# Patient Record
Sex: FEMALE | Race: WHITE | Employment: PART TIME | URBAN - METROPOLITAN AREA
[De-identification: names, ages, dates, MRNs, and addresses within clinical notes are randomized per-mention and may not be internally consistent; named-entity substitution may affect disease eponyms.]

---
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
## 2023-05-22 RX ORDER — IBUPROFEN 600 MG/1
600 TABLET ORAL EVERY 6 HOURS PRN
COMMUNITY
Start: 2020-10-10

## 2023-05-22 RX ORDER — ONDANSETRON 4 MG/1
4 TABLET, ORALLY DISINTEGRATING ORAL EVERY 8 HOURS PRN
COMMUNITY
Start: 2020-10-10

## 2023-11-01 ENCOUNTER — HOSPITAL ENCOUNTER (EMERGENCY)
Facility: HOSPITAL | Age: 23
Discharge: HOME | End: 2023-11-01
Attending: EMERGENCY MEDICINE
Payer: MEDICAID

## 2023-11-01 VITALS
HEART RATE: 78 BPM | OXYGEN SATURATION: 100 % | SYSTOLIC BLOOD PRESSURE: 103 MMHG | WEIGHT: 107.5 LBS | TEMPERATURE: 98.6 F | DIASTOLIC BLOOD PRESSURE: 65 MMHG | HEIGHT: 62 IN | RESPIRATION RATE: 19 BRPM | BODY MASS INDEX: 19.78 KG/M2

## 2023-11-01 DIAGNOSIS — R10.13 DYSPEPSIA: ICD-10-CM

## 2023-11-01 DIAGNOSIS — R10.11 ABDOMINAL PAIN, RIGHT UPPER QUADRANT: ICD-10-CM

## 2023-11-01 DIAGNOSIS — N30.00 ACUTE CYSTITIS WITHOUT HEMATURIA: ICD-10-CM

## 2023-11-01 DIAGNOSIS — R10.84 GENERALIZED ABDOMINAL PAIN: ICD-10-CM

## 2023-11-01 DIAGNOSIS — Z34.90 PREGNANCY, UNSPECIFIED GESTATIONAL AGE: Primary | ICD-10-CM

## 2023-11-01 LAB
ALBUMIN SERPL-MCNC: 3.1 G/DL (ref 3.5–5)
ALBUMIN/GLOB SERPL: 1 (ref 1.1–2.2)
ALP SERPL-CCNC: 32 U/L (ref 45–117)
ALT SERPL-CCNC: 13 U/L (ref 12–78)
ANION GAP SERPL CALC-SCNC: 12 MMOL/L (ref 5–15)
APPEARANCE UR: CLEAR
AST SERPL-CCNC: 31 U/L (ref 15–37)
BACTERIA URNS QL MICRO: ABNORMAL /HPF
BASOPHILS # BLD: 0 K/UL (ref 0–0.1)
BASOPHILS NFR BLD: 1 % (ref 0–1)
BILIRUB SERPL-MCNC: 0.4 MG/DL (ref 0.2–1)
BILIRUB UR QL: NEGATIVE
BUN SERPL-MCNC: 4 MG/DL (ref 6–20)
BUN/CREAT SERPL: 12 (ref 12–20)
CALCIUM SERPL-MCNC: 7.9 MG/DL (ref 8.5–10.1)
CHLORIDE SERPL-SCNC: 105 MMOL/L (ref 97–108)
CO2 SERPL-SCNC: 21 MMOL/L (ref 21–32)
COLOR UR: ABNORMAL
CREAT SERPL-MCNC: 0.33 MG/DL (ref 0.55–1.02)
DIFFERENTIAL METHOD BLD: NORMAL
EOSINOPHIL # BLD: 0 K/UL (ref 0–0.4)
EOSINOPHIL NFR BLD: 0 % (ref 0–7)
EPITH CASTS URNS QL MICRO: ABNORMAL /LPF
ERYTHROCYTE [DISTWIDTH] IN BLOOD BY AUTOMATED COUNT: 12.2 % (ref 11.5–14.5)
GLOBULIN SER CALC-MCNC: 3.1 G/DL (ref 2–4)
GLUCOSE SERPL-MCNC: 72 MG/DL (ref 65–100)
GLUCOSE UR STRIP.AUTO-MCNC: NEGATIVE MG/DL
HCG SERPL-ACNC: ABNORMAL MIU/ML (ref 0–6)
HCG UR QL: POSITIVE
HCT VFR BLD AUTO: 40.9 % (ref 35–47)
HGB BLD-MCNC: 13.4 G/DL (ref 11.5–16)
HGB UR QL STRIP: NEGATIVE
IMM GRANULOCYTES # BLD AUTO: 0 K/UL (ref 0–0.04)
IMM GRANULOCYTES NFR BLD AUTO: 0 % (ref 0–0.5)
KETONES UR QL STRIP.AUTO: 40 MG/DL
LEUKOCYTE ESTERASE UR QL STRIP.AUTO: ABNORMAL
LIPASE SERPL-CCNC: 17 U/L (ref 13–75)
LYMPHOCYTES # BLD: 1.7 K/UL (ref 0.8–3.5)
LYMPHOCYTES NFR BLD: 27 % (ref 12–49)
MCH RBC QN AUTO: 28.9 PG (ref 26–34)
MCHC RBC AUTO-ENTMCNC: 32.8 G/DL (ref 30–36.5)
MCV RBC AUTO: 88.3 FL (ref 80–99)
MONOCYTES # BLD: 0.4 K/UL (ref 0–1)
MONOCYTES NFR BLD: 7 % (ref 5–13)
NEUTS SEG # BLD: 4.1 K/UL (ref 1.8–8)
NEUTS SEG NFR BLD: 65 % (ref 32–75)
NITRITE UR QL STRIP.AUTO: POSITIVE
NRBC # BLD: 0 K/UL (ref 0–0.01)
NRBC BLD-RTO: 0 PER 100 WBC
PH UR STRIP: 6 (ref 5–8)
PLATELET # BLD AUTO: 270 K/UL (ref 150–400)
PMV BLD AUTO: 10.2 FL (ref 8.9–12.9)
POTASSIUM SERPL-SCNC: 4.5 MMOL/L (ref 3.5–5.1)
PROT SERPL-MCNC: 6.2 G/DL (ref 6.4–8.2)
PROT UR STRIP-MCNC: NEGATIVE MG/DL
RBC # BLD AUTO: 4.63 M/UL (ref 3.8–5.2)
RBC #/AREA URNS HPF: ABNORMAL /HPF (ref 0–5)
SODIUM SERPL-SCNC: 138 MMOL/L (ref 136–145)
SP GR UR REFRACTOMETRY: 1.02
UROBILINOGEN UR QL STRIP.AUTO: 1 EU/DL (ref 0.2–1)
WBC # BLD AUTO: 6.3 K/UL (ref 3.6–11)
WBC URNS QL MICRO: ABNORMAL /HPF (ref 0–4)

## 2023-11-01 PROCEDURE — 96361 HYDRATE IV INFUSION ADD-ON: CPT

## 2023-11-01 PROCEDURE — 96375 TX/PRO/DX INJ NEW DRUG ADDON: CPT

## 2023-11-01 PROCEDURE — 83690 ASSAY OF LIPASE: CPT

## 2023-11-01 PROCEDURE — C9113 INJ PANTOPRAZOLE SODIUM, VIA: HCPCS | Performed by: EMERGENCY MEDICINE

## 2023-11-01 PROCEDURE — 99284 EMERGENCY DEPT VISIT MOD MDM: CPT

## 2023-11-01 PROCEDURE — 85025 COMPLETE CBC W/AUTO DIFF WBC: CPT

## 2023-11-01 PROCEDURE — 80053 COMPREHEN METABOLIC PANEL: CPT

## 2023-11-01 PROCEDURE — 6370000000 HC RX 637 (ALT 250 FOR IP): Performed by: EMERGENCY MEDICINE

## 2023-11-01 PROCEDURE — 36415 COLL VENOUS BLD VENIPUNCTURE: CPT

## 2023-11-01 PROCEDURE — 96374 THER/PROPH/DIAG INJ IV PUSH: CPT

## 2023-11-01 PROCEDURE — 6360000002 HC RX W HCPCS: Performed by: EMERGENCY MEDICINE

## 2023-11-01 PROCEDURE — 84702 CHORIONIC GONADOTROPIN TEST: CPT

## 2023-11-01 PROCEDURE — A4216 STERILE WATER/SALINE, 10 ML: HCPCS | Performed by: EMERGENCY MEDICINE

## 2023-11-01 PROCEDURE — 81001 URINALYSIS AUTO W/SCOPE: CPT

## 2023-11-01 PROCEDURE — 81025 URINE PREGNANCY TEST: CPT

## 2023-11-01 PROCEDURE — 2580000003 HC RX 258: Performed by: EMERGENCY MEDICINE

## 2023-11-01 RX ORDER — CEPHALEXIN 500 MG/1
500 CAPSULE ORAL 3 TIMES DAILY
Qty: 21 CAPSULE | Refills: 0 | Status: SHIPPED | OUTPATIENT
Start: 2023-11-01 | End: 2023-11-08

## 2023-11-01 RX ORDER — ONDANSETRON 4 MG/1
4 TABLET, ORALLY DISINTEGRATING ORAL 3 TIMES DAILY PRN
Qty: 21 TABLET | Refills: 0 | Status: SHIPPED | OUTPATIENT
Start: 2023-11-01

## 2023-11-01 RX ORDER — FAMOTIDINE 40 MG/1
20 TABLET, FILM COATED ORAL DAILY
Qty: 30 TABLET | Refills: 0 | Status: SHIPPED | OUTPATIENT
Start: 2023-11-01

## 2023-11-01 RX ORDER — ONDANSETRON 2 MG/ML
4 INJECTION INTRAMUSCULAR; INTRAVENOUS EVERY 6 HOURS PRN
Status: DISCONTINUED | OUTPATIENT
Start: 2023-11-01 | End: 2023-11-02 | Stop reason: HOSPADM

## 2023-11-01 RX ORDER — 0.9 % SODIUM CHLORIDE 0.9 %
1000 INTRAVENOUS SOLUTION INTRAVENOUS ONCE
Status: COMPLETED | OUTPATIENT
Start: 2023-11-01 | End: 2023-11-01

## 2023-11-01 RX ORDER — CEPHALEXIN 500 MG/1
500 CAPSULE ORAL EVERY 6 HOURS SCHEDULED
Status: DISCONTINUED | OUTPATIENT
Start: 2023-11-02 | End: 2023-11-02 | Stop reason: HOSPADM

## 2023-11-01 RX ADMIN — CEPHALEXIN 500 MG: 500 CAPSULE ORAL at 22:08

## 2023-11-01 RX ADMIN — ONDANSETRON 4 MG: 2 INJECTION INTRAMUSCULAR; INTRAVENOUS at 20:01

## 2023-11-01 RX ADMIN — ALUMINUM HYDROXIDE, MAGNESIUM HYDROXIDE, AND SIMETHICONE 40 ML: 200; 200; 20 SUSPENSION ORAL at 20:01

## 2023-11-01 RX ADMIN — SODIUM CHLORIDE 1000 ML: 9 INJECTION, SOLUTION INTRAVENOUS at 20:00

## 2023-11-01 RX ADMIN — PANTOPRAZOLE SODIUM 40 MG: 40 INJECTION, POWDER, FOR SOLUTION INTRAVENOUS at 20:01

## 2023-11-01 ASSESSMENT — PAIN DESCRIPTION - DESCRIPTORS: DESCRIPTORS: ACHING

## 2023-11-01 ASSESSMENT — PAIN - FUNCTIONAL ASSESSMENT
PAIN_FUNCTIONAL_ASSESSMENT: NONE - DENIES PAIN
PAIN_FUNCTIONAL_ASSESSMENT: 0-10

## 2023-11-01 ASSESSMENT — PAIN SCALES - GENERAL
PAINLEVEL_OUTOF10: 8
PAINLEVEL_OUTOF10: 0

## 2023-11-01 ASSESSMENT — PAIN DESCRIPTION - LOCATION: LOCATION: ABDOMEN

## 2023-11-01 ASSESSMENT — PAIN DESCRIPTION - ORIENTATION: ORIENTATION: LEFT;RIGHT

## 2023-11-01 NOTE — ED PROVIDER NOTES
[facility] EMERGENCY DEPT  EMERGENCY DEPARTMENT ENCOUNTER       Pt Name: Jae Dandy  MRN: [id number]  [address] [facility] 2000  Date of evaluation: 11/1/2023  Provider: Benigno Morrison MD   PCP: Unknown, Provider  Note Started: 4:37 AM 11/1/23     CHIEF COMPLAINT       Chief Complaint   Patient presents with    Abdominal Pain        HISTORY OF PRESENT ILLNESS: 1 or more elements      History From: patient, History limited by:  none     Jae Dandy is a 26 y.o. female who presents with Diffuse abdominal pain for several weeks. [facility] and told she was pregnant. States she has had persistent pain and inability to consistently keep down food or soda. Admits to intermittent diarrhea. Denies fever, back pain, hematuria, vaginal discharge, dysuria, medical history, concern for STI. LMP was September 6. [facility] and states it is helping the nausea but the pain persist.     Nursing Notes were all reviewed and agreed with or any disagreements were addressed in the HPI. REVIEW OF SYSTEMS        Positives and Pertinent negatives as per HPI. PAST HISTORY     Past Medical History:  No past medical history on file. Past Surgical History:  No past surgical history on file. Family History:  No family history on file. Social History: Allergies:  No Known Allergies    CURRENT MEDICATIONS      Discharge Medication List as of 11/1/2023 10:21 PM        CONTINUE these medications which have NOT CHANGED    Details   ibuprofen (ADVIL;MOTRIN) 600 MG tablet Take 1 tablet by mouth every 6 hours as neededHistorical Med      !! ondansetron (ZOFRAN-ODT) 4 MG disintegrating tablet Take 1 tablet by mouth every 8 hours as neededHistorical Med       !! - Potential duplicate medications found. Please discuss with provider.           SCREENINGS               No data recorded         PHYSICAL EXAM      ED Triage Vitals [11/01/23 1911]   Enc Vitals Group      BP

## 2023-11-02 NOTE — ED NOTES
Patient (s)  given copy of dc instructions and 3 paper  script(s). Patient (s)  verbalized understanding of instructions and script (s). Patient given a current medication reconciliation form and verbalized understanding of their medications. Patient (s) verbalized understanding of the importance of discussing medications with his or her physician or clinic they will be following up with. Patient alert and oriented and in no acute distress. Patient discharged home ambulatory with a steady gait unassisted.       Gume Hartley RN  11/01/23 [id number]

## 2023-11-09 ENCOUNTER — OFFICE VISIT (OUTPATIENT)
Age: 23
End: 2023-11-09

## 2023-11-09 VITALS — DIASTOLIC BLOOD PRESSURE: 62 MMHG | SYSTOLIC BLOOD PRESSURE: 98 MMHG | BODY MASS INDEX: 19.94 KG/M2 | WEIGHT: 109 LBS

## 2023-11-09 DIAGNOSIS — Z34.90 PREGNANCY, UNSPECIFIED GESTATIONAL AGE: Primary | ICD-10-CM

## 2023-11-09 LAB
ABO, EXTERNAL RESULT: NORMAL
C. TRACHOMATIS, EXTERNAL RESULT: NEGATIVE
HEP B, EXTERNAL RESULT: NON REACTIVE
HEPATITIS C ANTIBODY, EXTERNAL RESULT: NON REACTIVE
HIV, EXTERNAL RESULT: NON REACTIVE
N. GONORRHOEAE, EXTERNAL RESULT: NEGATIVE
RH FACTOR, EXTERNAL RESULT: POSITIVE
RUBELLA TITER, EXTERNAL RESULT: NORMAL

## 2023-11-09 RX ORDER — TRIAMCINOLONE ACETONIDE 0.25 MG/G
OINTMENT TOPICAL
Qty: 80 G | Refills: 1 | Status: SHIPPED | OUTPATIENT
Start: 2023-11-09 | End: 2023-11-16

## 2023-11-10 LAB
ABO GROUP BLD: NORMAL
BLD GP AB SCN SERPL QL: NEGATIVE
ERYTHROCYTE [DISTWIDTH] IN BLOOD BY AUTOMATED COUNT: 12.3 % (ref 11.7–15.4)
HBV SURFACE AG SERPL QL IA: NEGATIVE
HCT VFR BLD AUTO: 37.6 % (ref 34–46.6)
HCV IGG SERPL QL IA: NON REACTIVE
HGB BLD-MCNC: 12.7 G/DL (ref 11.1–15.9)
HIV 1+2 AB+HIV1 P24 AG SERPL QL IA: NON REACTIVE
MCH RBC QN AUTO: 29.8 PG (ref 26.6–33)
MCHC RBC AUTO-ENTMCNC: 33.8 G/DL (ref 31.5–35.7)
MCV RBC AUTO: 88 FL (ref 79–97)
PLATELET # BLD AUTO: 278 X10E3/UL (ref 150–450)
RBC # BLD AUTO: 4.26 X10E6/UL (ref 3.77–5.28)
RH BLD: POSITIVE
RUBV IGG SERPL IA-ACNC: 2.16 INDEX
SPECIMEN STATUS REPORT: NORMAL
WBC # BLD AUTO: 4.9 X10E3/UL (ref 3.4–10.8)

## 2023-11-11 LAB
BACTERIA UR CULT: NO GROWTH
TREPONEMA PALLIDUM IGG+IGM AB [PRESENCE] IN SERUM OR PLASMA BY IMMUNOASSAY: NON REACTIVE

## 2023-11-13 LAB
HGB A MFR BLD ELPH: 97 % (ref 96.4–98.8)
HGB A2 MFR BLD ELPH: 3 % (ref 1.8–3.2)
HGB F MFR BLD ELPH: 0 % (ref 0–2)
HGB FRACT BLD-IMP: NORMAL
HGB S MFR BLD ELPH: 0 %

## 2023-11-15 DIAGNOSIS — O21.9 NAUSEA AND VOMITING IN PREGNANCY PRIOR TO 22 WEEKS GESTATION: Primary | ICD-10-CM

## 2023-11-15 LAB
C TRACH RRNA SPEC QL NAA+PROBE: NEGATIVE
Lab: NORMAL
N GONORRHOEA RRNA SPEC QL NAA+PROBE: NEGATIVE
NTRA 22Q11.2 DELETION SYNDROME POPULATION-BASED RISK TEXT: NORMAL
NTRA 22Q11.2 DELETION SYNDROME RESULT TEXT: NORMAL
NTRA 22Q11.2 DELETION SYNDROME RISK SCORE TEXT: NORMAL
NTRA FETAL FRACTION: NORMAL
NTRA GENDER OF FETUS: NORMAL
NTRA MONOSOMY X AGE-BASED RISK TEXT: NORMAL
NTRA MONOSOMY X RESULT TEXT: NORMAL
NTRA MONOSOMY X RISK SCORE TEXT: NORMAL
NTRA TRIPLOIDY RESULT TEXT: NORMAL
NTRA TRISOMY 13 AGE-BASED RISK TEXT: NORMAL
NTRA TRISOMY 13 RESULT TEXT: NORMAL
NTRA TRISOMY 13 RISK SCORE TEXT: NORMAL
NTRA TRISOMY 18 AGE-BASED RISK TEXT: NORMAL
NTRA TRISOMY 18 RESULT TEXT: NORMAL
NTRA TRISOMY 18 RISK SCORE TEXT: NORMAL
NTRA TRISOMY 21 AGE-BASED RISK TEXT: NORMAL
NTRA TRISOMY 21 RESULT TEXT: NORMAL
NTRA TRISOMY 21 RISK SCORE TEXT: NORMAL
T VAGINALIS RRNA SPEC QL NAA+PROBE: NEGATIVE
VZV IGG SER IA-ACNC: 176 INDEX

## 2023-11-15 RX ORDER — ONDANSETRON 4 MG/1
4 TABLET, ORALLY DISINTEGRATING ORAL 3 TIMES DAILY PRN
Qty: 21 TABLET | Refills: 0 | Status: SHIPPED | OUTPATIENT
Start: 2023-11-15

## 2023-11-17 LAB
Lab: NEGATIVE
Lab: NORMAL
NTRA CYSTIC FIBROSIS: NEGATIVE
NTRA DUCHENNE/BECKER MUSCULAR DYSTROPHY: NEGATIVE
NTRA FRAGILE X SYNDROME: NEGATIVE
NTRA SPINAL MUSCULAR ATROPHY: NEGATIVE

## 2023-12-06 ENCOUNTER — ROUTINE PRENATAL (OUTPATIENT)
Age: 23
End: 2023-12-06

## 2023-12-06 VITALS — SYSTOLIC BLOOD PRESSURE: 104 MMHG | BODY MASS INDEX: 20.49 KG/M2 | DIASTOLIC BLOOD PRESSURE: 74 MMHG | WEIGHT: 112 LBS

## 2023-12-06 DIAGNOSIS — Z34.00 SUPERVISION OF NORMAL FIRST PREGNANCY, ANTEPARTUM: ICD-10-CM

## 2023-12-06 DIAGNOSIS — Z3A.13 13 WEEKS GESTATION OF PREGNANCY: Primary | ICD-10-CM

## 2023-12-06 DIAGNOSIS — O26.899 PELVIC PAIN AFFECTING PREGNANCY, ANTEPARTUM: ICD-10-CM

## 2023-12-06 DIAGNOSIS — R10.2 PELVIC PAIN AFFECTING PREGNANCY, ANTEPARTUM: ICD-10-CM

## 2023-12-06 LAB
BILIRUBIN, URINE, POC: NEGATIVE
BLOOD URINE, POC: NEGATIVE
GLUCOSE URINE, POC: NEGATIVE
KETONES, URINE, POC: NEGATIVE
LEUKOCYTE ESTERASE, URINE, POC: NEGATIVE
NITRITE, URINE, POC: NEGATIVE
PH, URINE, POC: 6 (ref 4.6–8)
PROTEIN,URINE, POC: NEGATIVE
SPECIFIC GRAVITY, URINE, POC: 1.01 (ref 1–1.03)
URINALYSIS CLARITY, POC: CLEAR
URINALYSIS COLOR, POC: YELLOW
UROBILINOGEN, POC: NORMAL

## 2023-12-06 PROCEDURE — 0502F SUBSEQUENT PRENATAL CARE: CPT | Performed by: OBSTETRICS & GYNECOLOGY

## 2023-12-06 RX ORDER — ONDANSETRON 4 MG/1
4 TABLET, FILM COATED ORAL DAILY PRN
Qty: 30 TABLET | Refills: 1 | Status: SHIPPED | OUTPATIENT
Start: 2023-12-06

## 2023-12-06 RX ORDER — PNV NO.95/FERROUS FUM/FOLIC AC 28MG-0.8MG
1 TABLET ORAL DAILY
Qty: 30 TABLET | Refills: 11 | Status: SHIPPED | OUTPATIENT
Start: 2023-12-06

## 2023-12-06 RX ORDER — ASPIRIN 81 MG/1
81 TABLET ORAL DAILY
Qty: 2 TABLET | Refills: 0 | Status: SHIPPED | OUTPATIENT
Start: 2023-12-06

## 2023-12-06 RX ORDER — BISACODYL 5 MG/1
5 TABLET, DELAYED RELEASE ORAL DAILY PRN
Qty: 30 TABLET | Refills: 3 | Status: SHIPPED | OUTPATIENT
Start: 2023-12-06

## 2023-12-28 ENCOUNTER — ROUTINE PRENATAL (OUTPATIENT)
Age: 23
End: 2023-12-28

## 2023-12-28 VITALS — SYSTOLIC BLOOD PRESSURE: 106 MMHG | DIASTOLIC BLOOD PRESSURE: 58 MMHG | BODY MASS INDEX: 21.22 KG/M2 | WEIGHT: 116 LBS

## 2023-12-28 DIAGNOSIS — Z34.90 PREGNANCY, UNSPECIFIED GESTATIONAL AGE: Primary | ICD-10-CM

## 2023-12-28 PROCEDURE — 0502F SUBSEQUENT PRENATAL CARE: CPT | Performed by: OBSTETRICS & GYNECOLOGY

## 2023-12-28 RX ORDER — ONDANSETRON 4 MG/1
4 TABLET, FILM COATED ORAL DAILY PRN
Qty: 30 TABLET | Refills: 0 | Status: SHIPPED | OUTPATIENT
Start: 2023-12-28

## 2024-01-16 RX ORDER — ONDANSETRON 4 MG/1
4 TABLET, FILM COATED ORAL DAILY PRN
Qty: 30 TABLET | Refills: 0 | Status: SHIPPED | OUTPATIENT
Start: 2024-01-16

## 2024-01-19 ENCOUNTER — TELEPHONE (OUTPATIENT)
Age: 24
End: 2024-01-19

## 2024-01-19 NOTE — TELEPHONE ENCOUNTER
I have called and LM for the pt informing her that her nurse and doctor agree that she should be seen in an urgent care or ER setting.

## 2024-01-19 NOTE — TELEPHONE ENCOUNTER
Patient called, name and  verified. Patient is calling c/o chest pain that started yesterday accompanied with a headache. She reports that is comes on strong and then dissipates. Pt denies any SOB. She was unable to eat yesterday as she felt like it got worse. She denies any other sx. We talked about acid reflux which the patient darling snot think is the case. She has been advised to visit urgent care where she can be further evaluated but I wanted to check in with you. Please advise. She is currently a  and 20w1d. Age is 23.

## 2024-01-24 ENCOUNTER — ROUTINE PRENATAL (OUTPATIENT)
Age: 24
End: 2024-01-24

## 2024-01-24 VITALS — WEIGHT: 120 LBS | DIASTOLIC BLOOD PRESSURE: 64 MMHG | SYSTOLIC BLOOD PRESSURE: 104 MMHG | BODY MASS INDEX: 21.95 KG/M2

## 2024-01-24 DIAGNOSIS — Z36.1 ANTENATAL SCREENING FOR RAISED ALPHAFETOPROTEIN LEVEL: ICD-10-CM

## 2024-01-24 DIAGNOSIS — Z34.00 SUPERVISION OF NORMAL FIRST PREGNANCY, ANTEPARTUM: Primary | ICD-10-CM

## 2024-01-24 PROCEDURE — 0502F SUBSEQUENT PRENATAL CARE: CPT | Performed by: OBSTETRICS & GYNECOLOGY

## 2024-01-24 RX ORDER — FAMOTIDINE 20 MG/1
20 TABLET, FILM COATED ORAL DAILY
Qty: 30 TABLET | Refills: 3 | Status: SHIPPED | OUTPATIENT
Start: 2024-01-24

## 2024-01-24 NOTE — PROGRESS NOTES
Patient here for return OB visit at 20w6d. She reports she is feeling well, having heart burns it comes and it goes.  She reports good fetal movement.   She denies vaginal bleeding, loss of fluid, abnormal vaginal discharge, UTI symptoms, cramping, or contractions.     FAS today demonstrates the following -   A SINGLE VIABLE IUP AT 20W6D IS SEEN. FETAL CARDIAC MOTION OBSERVED.   FETAL ANATOMY WAS WELL VISUALIZED.   THERE APPEARS TO BE AN ECHOGENIC FOCI SEEN IN THE LEFT VENTRICLE OF THE FETAL HEART. OTHERWISE FETAL ANATOMY APPEARS WNL.   APPROPRIATE GROWTH MEASURED. SIZE = DATES.   WILMAR, PLACENTA AND CERVIX APPEAR WITHIN NORMAL LIMITS.      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    Isabelle Glover MD

## 2024-01-24 NOTE — PROGRESS NOTES
Patient here for return OB visit at 20w6d. She reports no concerns.  She reports good fetal movement.   She denies vaginal bleeding, loss of fluid, abnormal vaginal discharge, UTI symptoms, cramping, or contractions.     FAS today demonstrates the following -   A SINGLE VIABLE IUP AT 20W6D IS SEEN. FETAL CARDIAC MOTION OBSERVED.   FETAL ANATOMY WAS WELL VISUALIZED.   THERE APPEARS TO BE AN ECHOGENIC FOCI SEEN IN THE LEFT VENTRICLE OF THE FETAL HEART. OTHERWISE FETAL ANATOMY APPEARS WNL.   APPROPRIATE GROWTH MEASURED. SIZE = DATES.   WILMAR, PLACENTA AND CERVIX APPEAR WITHIN NORMAL LIMITS.      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 Cardiac echogenic foci  3rd TM labs: GTT___ Hgb___ Plts___  Flu:__ TDAP:__  Rhogam: O POSITIVE  GBS:  Postpartum Care: Breast/Bottle Circ      Problem List:  1)Eczema   -triamcinolone cream PRN  2)Cardiac echogenic foci   -normal NIPT          Return in about 4 weeks (around 2024), or growth US in four weeks with CHELI Glover MD

## 2024-01-24 NOTE — PROGRESS NOTES
Patient is doing well,   She has been having some heart burn it comes and it goes , haven't took anything for it    Denies VB and LOF  Active FM

## 2024-01-30 LAB
AFP INTERP SERPL-IMP: NORMAL
AFP MOM SERPL: 1.01
AFP SERPL-MCNC: 67.6 NG/ML
AGE AT DELIVERY: 22.6 YR
AGE OF EGG DONOR: NORMAL
AGE OF EGG DONOR: NORMAL
COMMENT: NORMAL
DONOR EGG?: NO
DONOR EGG?: NORMAL
FAMILY HISTORY NTD: NORMAL
FHX NTD (Y OR N): NORMAL
GA METHOD: NORMAL
GA: 20 WEEKS
IDDM PATIENT QL: NO
INSULIN DEP. DIABETIC: 1920
Lab: 120
Lab: NO
Lab: NORMAL
MAT SCN FOR FETAL ABNORMALITIES SERPL: NORMAL
MULTIPLE PREGNANCY: NO
NEURAL TUBE DEFECT RISK FETUS: NORMAL
NUMBER OF FETUSES: NO
OTHER INDICATIONS: NO
OTHER INDICATIONS: NORMAL
PREVIOUSLY ELEVATED AFP (Y OR N): 20
PREVIOUSLY ELEVATED AFP (Y OR N): NO
PRIOR 1ST TRIM TESTING ?: NO
PRIOR 2ND TRIM TESTING ?: NO
PRIOR DS/NTD SCREEN CURRENT PREGNANCY?: NO
PRIOR DS/NTD SCREEN CURRENT PREGNANCY?: NORMAL
PRIOR FIRST TRIMESTER TESTING (Y OR N ): NORMAL
PRIOR PREGNANCY WITH DOWN SYNDROME (Y OR N): 1
PRIOR PREGNANCY WITH DOWN SYNDROME (Y OR N): NO
TYPE OF EGG DONOR: NORMAL
TYPE OF EGG DONOR: NORMAL

## 2024-01-31 RX ORDER — ONDANSETRON 4 MG/1
4 TABLET, FILM COATED ORAL DAILY PRN
Qty: 30 TABLET | Refills: 0 | Status: SHIPPED | OUTPATIENT
Start: 2024-01-31

## 2024-02-20 ENCOUNTER — TELEPHONE (OUTPATIENT)
Age: 24
End: 2024-02-20

## 2024-02-20 NOTE — TELEPHONE ENCOUNTER
Patient called, name and  verified. Patient is calling c/o nausea and vomiting despite taking her antiemetic medications. She also reports being weak and unable to stand. She was last able to eat around 10:00 pm last night. She also reports that she feels she may have a fever. She has been advised to seek care at an urgent care and to update us. Pt has expressed understanding and will call us later today with an update. She is currently a  and 24w5d. Age is 23. Pt denies any additional sx.

## 2024-02-22 ENCOUNTER — ROUTINE PRENATAL (OUTPATIENT)
Age: 24
End: 2024-02-22

## 2024-02-22 VITALS — SYSTOLIC BLOOD PRESSURE: 114 MMHG | WEIGHT: 123 LBS | BODY MASS INDEX: 22.5 KG/M2 | DIASTOLIC BLOOD PRESSURE: 58 MMHG

## 2024-02-22 DIAGNOSIS — R42 DIZZINESS: ICD-10-CM

## 2024-02-22 DIAGNOSIS — O26.819 PREGNANCY RELATED FATIGUE, ANTEPARTUM: ICD-10-CM

## 2024-02-22 DIAGNOSIS — Z3A.27 27 WEEKS GESTATION OF PREGNANCY: Primary | ICD-10-CM

## 2024-02-22 PROCEDURE — 0502F SUBSEQUENT PRENATAL CARE: CPT | Performed by: OBSTETRICS & GYNECOLOGY

## 2024-02-22 RX ORDER — FAMOTIDINE 20 MG/1
20 TABLET, FILM COATED ORAL DAILY
Qty: 60 TABLET | Refills: 3 | Status: SHIPPED | OUTPATIENT
Start: 2024-02-22

## 2024-02-22 RX ORDER — ONDANSETRON 4 MG/1
4 TABLET, FILM COATED ORAL DAILY PRN
Qty: 30 TABLET | Refills: 0 | Status: SHIPPED | OUTPATIENT
Start: 2024-02-22

## 2024-02-22 NOTE — PROGRESS NOTES
Patient here for return OB visit at 25w0d.   Ultrasound follow up today due to echogenic foci.  LIMITED OB SCAN A SINGLE VERTEX 25W0D IUP IS SEEN. FETAL CARDIAC MOTION OBSERVED. THERE APPEARS TO BE AN ECHOGENIC FOCI SEEN IN THE LEFT VENTRICLE OF THE FETAL HEART. THE FEET BILATERALLY APPEARS SANDAL GAP. EFW= 1 LB 8 OZ ( 35 %) WILMAR= 20.8 CM PLACENTA APPEARS WITHIN NORMAL LIMITS.     Discussed referral to [facility] given above findings.   All questions answered   She reports good fetal movement.   She denies vaginal bleeding, loss of fluid, abnormal vaginal discharge, UTI symptoms, cramping, or contractions.       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 Cardiac echogenic foci  3rd TM labs: GTT___ Hgb___ Plts___  Flu:__ TDAP:__  Rhogam: O POSITIVE  GBS:  Postpartum Care: Breast/Bottle Circ      Problem List:  1)Eczema   -triamcinolone cream PRN  2)Cardiac echogenic foci   -normal NIPT          Return 28 week apt with glucola.

## 2024-02-23 ENCOUNTER — TELEPHONE (OUTPATIENT)
Age: 24
End: 2024-02-23

## 2024-02-23 NOTE — TELEPHONE ENCOUNTER
Patient returned call to office - scheduled ultrasound for 3/1 at 8am. Patient unable to do VV with genetic counselor today. Scheduled for 3/11 at 8am.

## 2024-03-01 ENCOUNTER — ROUTINE PRENATAL (OUTPATIENT)
Age: 24
End: 2024-03-01
Payer: MEDICAID

## 2024-03-01 VITALS — HEART RATE: 86 BPM | DIASTOLIC BLOOD PRESSURE: 70 MMHG | SYSTOLIC BLOOD PRESSURE: 101 MMHG

## 2024-03-01 DIAGNOSIS — Z3A.26 26 WEEKS GESTATION OF PREGNANCY: Primary | ICD-10-CM

## 2024-03-01 PROCEDURE — 76811 OB US DETAILED SNGL FETUS: CPT | Performed by: OBSTETRICS & GYNECOLOGY

## 2024-03-01 PROCEDURE — 99203 OFFICE O/P NEW LOW 30 MIN: CPT | Performed by: OBSTETRICS & GYNECOLOGY

## 2024-03-01 NOTE — PROCEDURES
referred at 26 weeks regarding the findings of an intracardiac echogenic focus in the fetal heart and possible sandal gap. She has  low risk NIPT.    Her and the FOB have a negative family history for congenital abnormalities.    Today' ultrasound reveals normal fetal anatomy of major structures. An echo genic focus is present and there subjectively a widened space between the first 2 toes in the  foot.  Biometry is appropriate    Regarding potential ultrasound markers for Down Syndrome the [facility] consult series # 57 from 2021 does not mention sandal gap as a Downs marker and as for the EIF if  it is otherwise structurally isolated no further testing is recommended. The appearance of the toes is likely normal variation. There are syndromes that can involve a sandal  gap appearance, CLOVES syndrome and possible ectrodactyly but there are no other findings to support either.    Plan:  Routine prenatal care    30 minutes spent in consultation.    Follow-up  ========    No Maternal Fetal Medicine follow up is indicated. We will gladly see your patient as clinically indicated.    Coding  ======    Code: 79349  Description: Ultrasound, pregnant uterus, real time with image documentation, fetal and maternal evaluation plus detailed fetal anatomic examination, transabdominal  approach;single or first gestation

## 2024-03-11 ENCOUNTER — TELEMEDICINE (OUTPATIENT)
Age: 24
End: 2024-03-11
Payer: MEDICAID

## 2024-03-11 DIAGNOSIS — O28.3 ABNORMAL ULTRASONIC FINDING ON ANTENATAL SCREENING OF MOTHER: Primary | ICD-10-CM

## 2024-03-11 DIAGNOSIS — Z3A.27 27 WEEKS GESTATION OF PREGNANCY: ICD-10-CM

## 2024-03-11 PROCEDURE — 96040 PR MEDICAL GENETICS COUNSELING EACH 30 MINUTES: CPT | Performed by: COUNSELOR

## 2024-03-12 ENCOUNTER — TELEPHONE (OUTPATIENT)
Age: 24
End: 2024-03-12

## 2024-03-12 NOTE — PROGRESS NOTES
Jessy Sanchez is a 23 y.o. female seen on 24 in our [place] office for genetic counseling regarding soft markers seen on previous ultrasound. Jessy Sanchez will be 23 at the Estimated Date of Delivery: 24; . Genetic counseling was performed via Telemedicine today. The patient was unaccompanied to her appointment.    Impression and Recommendations:    - The soft markers from the patient's previous ultrasound were reviewed, including relevant genetic risks in light of her normal NIPT results.  - The patient DECLINED diagnostic amniocentesis at this time.  - The patient's normal carrier screening results were reviewed.  - The patient is not currently scheduled for follow up with our office, but we are happy to see her back as clinically indicated.    Family and pregnancy histories were taken. The following information was discussed with the patient:    Genetic Screening:    Jessy previously had an ultrasound with the [facility] that identified an echogenic intraventricular cardiac focus (EICF) and a potential sandalgap toe. Per the MFM note from 3/1/24, Dr. Gonsalez notes that an EICF is present and that \"there subjectively a widened space between the first 2 toes in the foot.\"    Fetal intraventricular echogenic foci (EICF) have been considered as a benign condition, probably representing a normal variant of the development of papillary muscles in most pregnancies. It is usually noted singly and on the left side; occasionally it can be seen on the right or bilaterally.  When the heart is otherwise structurally normal, there is no evidence of anatomic or functional significance through childhood. Some studies have suggested that there may be an increased risk for chromosome abnormality when EICF are noted on ultrasound, especially if there are other indications for increased risk (ex: AMA, abnormal screening, or other differences identified by ultrasound). Literature reviews

## 2024-03-12 NOTE — TELEPHONE ENCOUNTER
Called patient at her request via [employer], as she said she had questions. Spoke with the patient on the phone, she was asking about payment due at time of 3/14/24 appointment. Advised patient that her appointment on 3/14/24 is with Dr. Glover's office, her OB, and told patient she would have to give their office a call to get that question answered.    The patient verbalized her understanding of the information as it was presented to her today; she denies any further questions or concerns at this time.    Chani Perez MS, [facility]  Licensed, Certified Genetic Counselor

## 2024-03-21 ENCOUNTER — ROUTINE PRENATAL (OUTPATIENT)
Age: 24
End: 2024-03-21

## 2024-03-21 VITALS — BODY MASS INDEX: 23.41 KG/M2 | SYSTOLIC BLOOD PRESSURE: 102 MMHG | WEIGHT: 128 LBS | DIASTOLIC BLOOD PRESSURE: 60 MMHG

## 2024-03-21 DIAGNOSIS — Z34.90 PREGNANCY, UNSPECIFIED GESTATIONAL AGE: ICD-10-CM

## 2024-03-21 DIAGNOSIS — Z34.90 ENCOUNTER FOR SUPERVISION OF NORMAL PREGNANCY, ANTEPARTUM, UNSPECIFIED GRAVIDITY: Primary | ICD-10-CM

## 2024-03-21 PROCEDURE — 0502F SUBSEQUENT PRENATAL CARE: CPT | Performed by: OBSTETRICS & GYNECOLOGY

## 2024-03-21 RX ORDER — LANOLIN ALCOHOL/MO/W.PET/CERES
325 CREAM (GRAM) TOPICAL
Qty: 30 TABLET | Refills: 5 | Status: SHIPPED | OUTPATIENT
Start: 2024-03-21

## 2024-03-21 NOTE — PROGRESS NOTES
Patient here for return OB visit at 29w0d. She reports that she recently went to the [facility] office for support and they told her that her fingerstick showed that she had an iron level of 9.7. iron sent     She reports good fetal movement.   She denies vaginal bleeding, loss of fluid, abnormal vaginal discharge, UTI symptoms, cramping, or contractions.     Patient was drinking a Petra Sun prior to her GTT, therefore it was canceled.  I reviewed how this could give a false positive result on test and we discussed when she could come back for her GTT and 3rd trimester labs.      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 Cardiac echogenic foci  3rd TM labs: GTT___ Hgb___ Plts___  Flu:__ TDAP:__  Rhogam: O POSITIVE  GBS:  Postpartum Care: Breast/Bottle Circ      Problem List:  1)Eczema   -triamcinolone cream PRN  2)Cardiac echogenic foci   -normal NIPT          Return for ASAP for glucola- would like 0900 tomorrow.  two weeks for apt .    Isabelle Glover MD

## 2024-04-04 ENCOUNTER — ROUTINE PRENATAL (OUTPATIENT)
Age: 24
End: 2024-04-04

## 2024-04-04 VITALS — WEIGHT: 127 LBS | SYSTOLIC BLOOD PRESSURE: 100 MMHG | DIASTOLIC BLOOD PRESSURE: 78 MMHG | BODY MASS INDEX: 23.23 KG/M2

## 2024-04-04 DIAGNOSIS — Z34.90 PREGNANCY, UNSPECIFIED GESTATIONAL AGE: Primary | ICD-10-CM

## 2024-04-04 PROCEDURE — 0502F SUBSEQUENT PRENATAL CARE: CPT | Performed by: OBSTETRICS & GYNECOLOGY

## 2024-04-04 RX ORDER — LANCETS 30 GAUGE
1 EACH MISCELLANEOUS DAILY
Qty: 100 EACH | Refills: 5 | Status: SHIPPED | OUTPATIENT
Start: 2024-04-04

## 2024-04-04 RX ORDER — BLOOD-GLUCOSE METER
1 KIT MISCELLANEOUS DAILY
Qty: 1 KIT | Refills: 0 | Status: SHIPPED | OUTPATIENT
Start: 2024-04-04

## 2024-04-04 RX ORDER — GLUCOSAMINE HCL/CHONDROITIN SU 500-400 MG
CAPSULE ORAL
Qty: 160 STRIP | Refills: 5 | Status: SHIPPED | OUTPATIENT
Start: 2024-04-04

## 2024-04-04 NOTE — PROGRESS NOTES
Patient here for return OB visit at 31w0d. She reports no concerns   She reports good fetal movement.   She denies vaginal bleeding, loss of fluid, abnormal vaginal discharge, UTI symptoms, cramping, or contractions.     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 Cardiac echogenic foci  3rd TM labs: GTT___ Hgb___ Plts___  Flu:__ TDAP:__  Rhogam: O POSITIVE  GBS:  Postpartum Care: Breast/Bottle Circ      Problem List:  1)Eczema   -triamcinolone cream PRN  2)Cardiac echogenic foci   -normal NIPT   3) Sandal gap feet   -s/p MFM normal US otherwise         Return for one week for Growth US S<D and apt to follow. 32, 34, 36, weekly .    Isabelle Glover MD

## 2024-04-04 NOTE — PROGRESS NOTES
Patient here for return OB visit at 31w0d. She reports nausea and vomiting in the morning and LUQ pain.  She reports good fetal movement.   She denies vaginal bleeding, loss of fluid, abnormal vaginal discharge, UTI symptoms, cramping, or contractions.     Patient has not seen MFM.   Patient unable to keep glucola down.       Per Dr. Glover's note from last ov on :    Patient here for return OB visit at 25w0d.   Ultrasound follow up today due to echogenic foci.  LIMITED OB SCAN A SINGLE VERTEX 25W0D IUP IS SEEN. FETAL CARDIAC MOTION OBSERVED. THERE APPEARS TO BE AN ECHOGENIC FOCI SEEN IN THE LEFT VENTRICLE OF THE FETAL HEART. THE FEET BILATERALLY APPEARS SANDAL GAP. EFW= 1 LB 8 OZ ( 35 %) WILMAR= 20.8 CM PLACENTA APPEARS WITHIN NORMAL LIMITS.      Discussed referral to MFM given above findings.   All questions answered   She reports good fetal movement.   She denies vaginal bleeding, loss of fluid, abnormal vaginal discharge, UTI symptoms, cramping, or contractions.             From [facility] on 24 :   Jessy Sanchez is a 23 y.o.  at 29w4d gestation presenting from the ED with left sided chest pain, n/v, and dizziness. Around 1500 today (), the patient was sitting at her desk at work when she began experiencing 7/10 left sided chest pain and began feeling dizzy. She described her pain as sharp, stabbing, and focal which improved with tylenol. When she stood up, she developed tunnel vision and had difficulty walking without staggering. She endorsed SOB and nausea at the time. She vomited shortly after which seemed to help. She denies any fevers, chills, abdominal pain, contractions, vaginal bleeding, leakage of fluid, or decreased fetal movements. She endorses thin, clear vaginal discharge without pruritus, dysuria, and increased urinary frequency. Cardiac workup in the ED returned negative. Sent to L&D for abdominal pain.   Labs from ED visit:  CREATININE 0.55   EGFR >120   WBC 8.3    HGB 9.5 (L)

## 2024-04-05 LAB
BLD GP AB SCN SERPL QL: NEGATIVE
ERYTHROCYTE [DISTWIDTH] IN BLOOD BY AUTOMATED COUNT: 12.6 % (ref 11.7–15.4)
HCT VFR BLD AUTO: 27.7 % (ref 34–46.6)
HGB BLD-MCNC: 9.1 G/DL (ref 11.1–15.9)
MCH RBC QN AUTO: 27.9 PG (ref 26.6–33)
MCHC RBC AUTO-ENTMCNC: 32.9 G/DL (ref 31.5–35.7)
MCV RBC AUTO: 85 FL (ref 79–97)
PLATELET # BLD AUTO: 320 X10E3/UL (ref 150–450)
RBC # BLD AUTO: 3.26 X10E6/UL (ref 3.77–5.28)
T4 SERPL-MCNC: 14.9 UG/DL (ref 4.5–12)
TSH SERPL DL<=0.005 MIU/L-ACNC: 1.98 UIU/ML (ref 0.45–4.5)
WBC # BLD AUTO: 6.5 X10E3/UL (ref 3.4–10.8)

## 2024-04-06 LAB — TREPONEMA PALLIDUM IGG+IGM AB [PRESENCE] IN SERUM OR PLASMA BY IMMUNOASSAY: NON REACTIVE

## 2024-04-23 ENCOUNTER — TELEPHONE (OUTPATIENT)
Age: 24
End: 2024-04-23

## 2024-04-23 NOTE — TELEPHONE ENCOUNTER
Called pt and left VM to follow up on missed visits, ultrasounds, and lab work. Requested patient call us back

## 2024-05-11 ENCOUNTER — HOSPITAL ENCOUNTER (OUTPATIENT)
Facility: HOSPITAL | Age: 24
Discharge: HOME | End: 2024-05-11
Attending: OBSTETRICS & GYNECOLOGY | Admitting: OBSTETRICS & GYNECOLOGY
Payer: MEDICAID

## 2024-05-11 VITALS
HEART RATE: 90 BPM | TEMPERATURE: 98.3 F | DIASTOLIC BLOOD PRESSURE: 55 MMHG | RESPIRATION RATE: 20 BRPM | SYSTOLIC BLOOD PRESSURE: 90 MMHG | OXYGEN SATURATION: 100 %

## 2024-05-11 LAB — GBS, EXTERNAL RESULT: NEGATIVE

## 2024-05-11 PROCEDURE — 6370000000 HC RX 637 (ALT 250 FOR IP): Performed by: OBSTETRICS & GYNECOLOGY

## 2024-05-11 PROCEDURE — G0378 HOSPITAL OBSERVATION PER HR: HCPCS

## 2024-05-11 PROCEDURE — G0379 DIRECT REFER HOSPITAL OBSERV: HCPCS

## 2024-05-11 RX ORDER — ACETAMINOPHEN 500 MG
1000 TABLET ORAL
Status: COMPLETED | OUTPATIENT
Start: 2024-05-11 | End: 2024-05-11

## 2024-05-11 RX ADMIN — ACETAMINOPHEN 1000 MG: 500 TABLET ORAL at 03:30

## 2024-05-11 ASSESSMENT — PAIN DESCRIPTION - ORIENTATION: ORIENTATION: LOWER

## 2024-05-11 ASSESSMENT — PAIN DESCRIPTION - LOCATION: LOCATION: ABDOMEN

## 2024-05-11 ASSESSMENT — PAIN DESCRIPTION - DESCRIPTORS: DESCRIPTORS: ACHING

## 2024-05-11 ASSESSMENT — PAIN SCALES - GENERAL: PAINLEVEL_OUTOF10: 6

## 2024-05-11 NOTE — DISCHARGE INSTRUCTIONS
Week 37 of Your Pregnancy: Care Instructions    Most babies are born between 37 and 40 weeks.    This is a good time to pack a bag to take with you to the birth. Then it will be ready to go when you are.    Learn about breastfeeding.  For example, find out about ways to hold your baby to make breastfeeding easier. And think about learning how to pump and store milk.     Know that crying is normal.  It's common for babies to cry 1 to 3 hours a day. Some cry more, and some cry less.     Learn why babies cry.  They may be hungry; have gas; need a diaper change; or feel cold, warm, tired, lonely, or tense. Sometimes they cry for unknown reasons.     Think about what will help you stay calm when your baby cries.  Taking slow, deep breaths can help. So can taking a break. It's okay to put your baby somewhere safe (like their crib) and walk away for a few minutes.     Learn about safe sleep for your baby.  Always put your baby to sleep on their back. Place them alone in a crib or bassinet with a firm, flat surface. Keep soft items like stuffed animals out of the crib.     Learn what to expect with  poop.  Your baby will have their own bowel patterns. Some babies have several bowel movements a day. Some have fewer.     Know that  babies will often have loose, yellow bowel movements.  Formula-fed babies have more formed stools. If your baby's poop looks like pellets, your baby is constipated.   Follow-up care is a key part of your treatment and safety. Be sure to make and go to all appointments, and call your doctor if you are having problems. It's also a good idea to know your test results and keep a list of the medicines you take.  Where can you learn more?  Go to https://www.[employer].net/patientEd and enter N257 to learn more about \"Week 37 of Your Pregnancy: Care Instructions.\"  Current as of: July 10, 2023               Content Version: 14.0  © 7980-8148 [employer].   Care  instructions adapted under license by [employer]. If you have questions about a medical condition or this instruction, always ask your healthcare professional. [employer]wise, Incorporated disclaims any warranty or liability for your use of this information.

## 2024-05-11 NOTE — ED TRIAGE NOTES
AntePartum High Risk Pregnancy Note    Jessy STEFANIA Sanchez  36w2d    HPI: Patient is a 22 y/o F  admitted for  36w2d Estimated Date of Delivery: 24 states she was having contractions every 5-10 minutes. Denied PROM, vaginal bleeding, nausea, vomiting, chills or fever. Prenatal care uncomplicated with [facility].    Vitals:  Patient Vitals for the past 24 hrs:   BP Temp Temp src Pulse Resp SpO2   24 0220 (!) 90/55 98.3 °F (36.8 °C) Oral 90 20 100 %     Temp (24hrs), Av.3 °F (36.8 °C), Min:98.3 °F (36.8 °C), Max:98.3 °F (36.8 °C)    I&O:   No intake/output data recorded.             No intake/output data recorded.    Exam:  Patient without distress.               Abdomen soft, non-tender               Fundus soft and non tender               Right upper quadrant non-tender               Perineum No sign of blood or amniotic fluid               Lower extremities edema No               Patellar Reflexes:               Dilation: 0 cm     Effacement: Long  Not Checked    Station: Floating     Uterine Activity: None               NST:  Reactive with multiple 15 beat accelerations.           Labs: No results found for this or any previous visit (from the past 24 hour(s)).    Assessment and Plan:    Patient Active Problem List    Diagnosis Date Noted    Pregnancy 2023     Impression: False labor at 36 weeks 2 days.    Plan: Discharge home on labor precautions. Tylenol Q8 PRN for discomfort. Followup with Dr Coulter in 48 hrs or PRN.

## 2024-05-11 NOTE — PROGRESS NOTES
Received pt from home, G1 pt of Dr ANDRAE Coulter, [place] 6/6, presented today with c/o having ctx started about 3 hours ago, every 10-15 mins, deny LOF or vag bleeding deny any complications for this pregnancy, efmx2 applied     0305 Dr toledo in room, tracing reviewed, NST reactive, SVE done, cervix is closed, order received to give Tylenol 1000mg now and to discharge home follow up with scheduled appt    0332 pt is discharged with discharge instruction

## 2024-05-12 LAB — T. PALLIDUM (SYPHILIS) ANTIBODY, EXTERNAL RESULT: NON REACTIVE

## 2024-05-25 ENCOUNTER — HOSPITAL ENCOUNTER (OUTPATIENT)
Facility: HOSPITAL | Age: 24
Discharge: HOME | End: 2024-05-25
Attending: OBSTETRICS & GYNECOLOGY | Admitting: OBSTETRICS & GYNECOLOGY
Payer: MEDICAID

## 2024-05-25 VITALS
HEIGHT: 62 IN | DIASTOLIC BLOOD PRESSURE: 75 MMHG | OXYGEN SATURATION: 100 % | WEIGHT: 129 LBS | SYSTOLIC BLOOD PRESSURE: 101 MMHG | HEART RATE: 110 BPM | BODY MASS INDEX: 23.74 KG/M2 | RESPIRATION RATE: 18 BRPM | TEMPERATURE: 98.6 F

## 2024-05-25 PROCEDURE — 99212 OFFICE O/P EST SF 10 MIN: CPT

## 2024-05-25 PROCEDURE — 4500000002 HC ER NO CHARGE

## 2024-05-25 NOTE — PROGRESS NOTES
0830: Jessy Sanchez is a 23 y.o.  at 38w2d patient of Dr Coulter at [facility] who presents to L&D triage with c/o contractions since 300 today increasing in intensity. She reports Positive FM, denies vaginal bleeding and LOF. She also denies Headaches, Scotoma, RUQ pain, and Edema. Urine sample obtained. EFM and toco placed for initial assessment.    [id number]: Dr. Tobar at bedside for evluation. SVE performed by MD with pt consent. MD reviewed fetal tracing. Education given and all questions answered     1015: Patient education provided regarding discharge. Opportunity given for patient to ask questions all questions answered appropriately. Patient ambulated off unit. Patient remains remains pregnant and plans to deliver at [facility].

## 2024-05-25 NOTE — H&P
OB History & Physical    Name: Jessy Sanchez MRN: [id number]  SSN: [id number]    YOB: 2000  Age: 23 y.o.  Sex: female      Subjective:     Chief complaint: contractions    History of Present Illness: Jessy Sanchez is a female with an estimated gestational age of 38w2d with Estimated Date of Delivery: 24. Patient complains of contractions that have been 10-15 minutes apart. She states she went to [facility] at some point recently and was told she had ra[place]. She states these are worse and getting stronger. Denies leaking of fluid, bleeding, recent intercourse. She has normal fetal movement.     She sees [facility]    OB History          1    Para        Term                AB        Living             SAB        IAB        Ectopic        Molar        Multiple        Live Births                  History reviewed. No pertinent past medical history.  History reviewed. No pertinent surgical history.  Reviewed, denies any significant medical or surgical history    Social History     Occupational History    Not on file   Tobacco Use    Smoking status: Never    Smokeless tobacco: Never   Vaping Use    Vaping Use: Never used   Substance and Sexual Activity    Alcohol use: Never    Drug use: Never    Sexual activity: Yes     Partners: Male     Birth control/protection: None     History reviewed. No pertinent family history.    No Known Allergies  Prior to Admission medications    Medication Sig Start Date End Date Taking? Authorizing Provider   glucose monitoring kit 1 kit by Does not apply route daily  Patient not taking: Reported on 2024   Isabelle Glover MD   Lancets MISC 1 each by Does not apply route daily  Patient not taking: Reported on 2024   Isabelle Glover MD   blood glucose monitor strips Test 4 times a day & as needed for symptoms of irregular blood glucose. Dispense sufficient amount for indicated testing frequency plus additional to accommodate

## 2024-05-28 ENCOUNTER — HOSPITAL ENCOUNTER (OUTPATIENT)
Facility: HOSPITAL | Age: 24
Discharge: HOME | End: 2024-05-28
Attending: EMERGENCY MEDICINE | Admitting: OBSTETRICS & GYNECOLOGY
Payer: MEDICAID

## 2024-05-28 VITALS
HEIGHT: 62 IN | HEART RATE: 116 BPM | SYSTOLIC BLOOD PRESSURE: 101 MMHG | BODY MASS INDEX: 23.69 KG/M2 | DIASTOLIC BLOOD PRESSURE: 72 MMHG | OXYGEN SATURATION: 98 % | RESPIRATION RATE: 18 BRPM | WEIGHT: 128.75 LBS | TEMPERATURE: 98.1 F

## 2024-05-28 DIAGNOSIS — O47.9 UTERINE CONTRACTIONS DURING PREGNANCY: Primary | ICD-10-CM

## 2024-05-28 LAB
AMNISURE, POC: NEGATIVE
Lab: NORMAL
NEGATIVE QC PASS/FAIL: NORMAL
POSITIVE QC PASS/FAIL: NORMAL

## 2024-05-28 PROCEDURE — 59025 FETAL NON-STRESS TEST: CPT | Performed by: STUDENT IN AN ORGANIZED HEALTH CARE EDUCATION/TRAINING PROGRAM

## 2024-05-28 NOTE — PROGRESS NOTES
1055: Assumed care from BLAISE Beck RN at this time. Pt noted to be in stable condition. Dr. Koenig at bedside to assess patient, SVE performed at this time with consent from patient.     Cervix noted to be FT. MD gave order for patient to walk around and plans to reassess in one hour.

## 2024-05-28 NOTE — ED NOTES
SBAR telephone report given to Clara FRIAS L&D. Alli BOOTH in TR to eval pt and call OB hospitalist

## 2024-05-28 NOTE — H&P
OB History & Physical    Name: Jessy Sanchez MRN: [id number]  SSN: [id number]    YOB: 2000  Age: 23 y.o.  Sex: female      Subjective:     Reason for Admission:  Pregnancy and possible leaking fluid, contractions    History of Present Illness: Jessy Sanchez is a 23 y.o.  female with an estimated gestational age of 38w5d with Estimated Date of Delivery: 24. Patient complains of leaking fluid X 1 about an hour before arrival as well as abdominal pain, possible contractions.  She denies bleeding; reports fetal activity.    The patient's prenatal record is reviewed: 24 - 24  OB History          1    Para        Term                AB        Living             SAB        IAB        Ectopic        Molar        Multiple        Live Births                  No past medical history on file.  No past surgical history on file.  Social History     Occupational History    Not on file   Tobacco Use    Smoking status: Never    Smokeless tobacco: Never   Vaping Use    Vaping Use: Never used   Substance and Sexual Activity    Alcohol use: Never    Drug use: Never    Sexual activity: Yes     Partners: Male     Birth control/protection: None     No family history on file.  SH:  Patient denies tobacco, alcohol, recreational drugs.  .    FH: reviewed and negative    Review of systems:    General: Denies fever, sweats, chills  CV: Denies CP, palpitations  Resp: Denies SOB, cough  GI: Denies nausea, vomiting, diarrhea  : Denies dysura, abnormal frequency, hematuria  Neuro: Denies HA, visual disturbance  All other systems reviewed and are negative    No Known Allergies  Prior to Admission medications    Medication Sig Start Date End Date Taking? Authorizing Provider   glucose monitoring kit 1 kit by Does not apply route daily  Patient not taking: Reported on 2024   Isabelle Glover MD   Lancets MISC 1 each by Does not apply route daily  Patient not taking: Reported

## 2024-05-28 NOTE — PROGRESS NOTES
Jessy Sanchez is a 23 y.o.  at 38w5d patient of Dr Coulter at [facility] who presents to L&D triage with c/o contractions and leaking of fluid. She reports Positive FM, denies vaginal bleeding. She also denies Headaches, Scotoma, RUQ pain, and Edema. Urine sample obtained. EFM and toco placed for initial assessment.

## 2024-05-28 NOTE — ED PROVIDER NOTES
Brief Medical Screening Examination for OB patient at triage    HPI:22yo female 39 weeks pregnant complains of water breaking and pelvic contractions every 10 minutes.  Patient states the contractions last 2 to 5 minutes and have become more severe.  Her OB/GYN is Clare Coulter.    ROS: Nausea, vomiting, contractions    Vitals: Patient Vitals for the past 4 hrs:   Pulse Resp BP SpO2   05/28/24 1020 90 16 103/71 100 %       Physical Exam:  General appearance: No apparent distress.  Stable vitals.  Afebrile.  Skin exam: Warm and dry.  No pallor.  Neurologic: Alert and oriented.  Abdominal exam: Soft, nontender.  Gravid uterus.    Differential diagnosis: Labor, premature labor, threatened miscarriage, Rincon-Chi contractions, uterine contractions in pregnancy, premature rupture of membranes.    This patient with a primary obstetrical chief complaint is stable and medically cleared for direct transfer to the OB Labor & Delivery unit for further monitoring and workup.    ED Course as of 05/28/24 1029   Tue May 28, 2024   1026 Tomas Carson MD spoke with Dr. Rhys Koenig, Consult for OB/GYN. Discussed HPI and PE, available diagnostic tests and clinical findings. He is in agreement with care plans as outlined.  He agrees with transferring the patient up to labor and delivery for further monitoring. [MS]      ED Course User Index  [MS] Tomas Carson MD     Medical screening exam is complete.    MD Alli Ortiz Michael W, MD  05/28/24 1024

## 2024-05-28 NOTE — PROGRESS NOTES
OB Progress Note  Patient seen, fetal heart rate and contraction pattern evaluated.   Patient states cramping about the same; no more leaking.    Physical Exam:  /72   Pulse (!) 116   Temp 98.1 °F (36.7 °C) (Oral)   Resp 18   Ht 1.575 m (5' 2\")   Wt 58.4 kg (128 lb 12 oz)   SpO2 98%   BMI 23.55 kg/m²   Cervical Exam: FT/60/-1---exam difficult for patient  Membranes:  intact  Uterine Activity: irregular  Fetal Heart Rate: 120 - 130,  adequate variability and reactivity  Accelerations: yes  Decelerations: none    Assessment/Plan:  Reassuring fetal status.   False labor  Discharge home  Follow up as scheduled on Thursday in the office  Precautions discussed; questions answered    ADITHYA Koenig MD

## 2024-05-28 NOTE — DISCHARGE INSTRUCTIONS
your contractions are stronger.  Many women stay at home during early labor. This is often the longest part of the birthing process. It may last up to 2 to 3 days. Contractions are mild to moderate and shorter (about 30 to 45 seconds). You can usually keep talking during them. Contractions may also be irregular, about 5 to 20 minutes apart. They may even stop for a while.  It helps to stay as relaxed as you can during this time. You can spend some or all of your early labor at home or anywhere else you may be comfortable. If you live far from the hospital or birthing center, you may want to think about going somewhere nearby so you can get back to the hospital quickly.  For some women, there may be benefits to staying home during early labor, such as avoiding medicines or procedures.  As labor progresses, you'll shift from early labor to active labor. During this time, contractions get more intense. They occur more often, about every 2 to 3 minutes. They also last longer, about 50 to 70 seconds. You will feel them even when you change positions and walk or move around.  It may be hard to tell if you are in active labor. If you aren't sure, call your doctor or midwife. As your labor progresses, check in with your doctor or midwife about when to come back to the hospital or birthing center. You may have special instructions if your water broke or you tested positive for group B strep.  Follow-up care is a key part of your treatment and safety. Be sure to make and go to all appointments, and call your doctor if you are having problems. It's also a good idea to know your test results and keep a list of the medicines you take.  How can you care for yourself at home?  Get support. Having a support person with you from early labor until after childbirth can have a positive effect on childbirth.  Find distractions. During early labor, you can walk, play cards, watch TV, or listen to music to help take your mind off your

## 2024-05-28 NOTE — PROGRESS NOTES
Patient education provided regarding discharge. Opportunity given for patient to ask questions all questions answered appropriately. Patient ambulated off unit. Patient remains remains pregnant and plans to deliver at [facility].

## 2024-06-02 ENCOUNTER — HOSPITAL ENCOUNTER (INPATIENT)
Facility: HOSPITAL | Age: 24
LOS: 3 days | Discharge: HOME | DRG: 560 | End: 2024-06-05
Attending: OBSTETRICS & GYNECOLOGY | Admitting: OBSTETRICS & GYNECOLOGY
Payer: MEDICAID

## 2024-06-02 ENCOUNTER — APPOINTMENT (OUTPATIENT)
Facility: HOSPITAL | Age: 24
DRG: 560 | End: 2024-06-02
Payer: MEDICAID

## 2024-06-02 PROBLEM — Z34.03 PRIMIGRAVIDA, THIRD TRIMESTER: Status: ACTIVE | Noted: 2024-06-02

## 2024-06-02 LAB
AMPHET UR QL SCN: NEGATIVE
BARBITURATES UR QL SCN: NEGATIVE
BASOPHILS # BLD: 0 K/UL (ref 0–0.1)
BASOPHILS NFR BLD: 0 % (ref 0–1)
BENZODIAZ UR QL: NEGATIVE
CANNABINOIDS UR QL SCN: NEGATIVE
COCAINE UR QL SCN: NEGATIVE
DIFFERENTIAL METHOD BLD: ABNORMAL
EOSINOPHIL # BLD: 0 K/UL (ref 0–0.4)
EOSINOPHIL NFR BLD: 0 % (ref 0–7)
ERYTHROCYTE [DISTWIDTH] IN BLOOD BY AUTOMATED COUNT: 16.8 % (ref 11.5–14.5)
HCT VFR BLD AUTO: 31.2 % (ref 35–47)
HGB BLD-MCNC: 9.3 G/DL (ref 11.5–16)
IMM GRANULOCYTES # BLD AUTO: 0.1 K/UL (ref 0–0.04)
IMM GRANULOCYTES NFR BLD AUTO: 1 % (ref 0–0.5)
LYMPHOCYTES # BLD: 1.9 K/UL (ref 0.8–3.5)
LYMPHOCYTES NFR BLD: 22 % (ref 12–49)
Lab: NORMAL
MCH RBC QN AUTO: 24.1 PG (ref 26–34)
MCHC RBC AUTO-ENTMCNC: 29.8 G/DL (ref 30–36.5)
MCV RBC AUTO: 80.8 FL (ref 80–99)
METHADONE UR QL: NEGATIVE
MONOCYTES # BLD: 0.7 K/UL (ref 0–1)
MONOCYTES NFR BLD: 8 % (ref 5–13)
NEUTS SEG # BLD: 6 K/UL (ref 1.8–8)
NEUTS SEG NFR BLD: 69 % (ref 32–75)
NRBC # BLD: 0 K/UL (ref 0–0.01)
NRBC BLD-RTO: 0 PER 100 WBC
OPIATES UR QL: NEGATIVE
PCP UR QL: NEGATIVE
PLATELET # BLD AUTO: 358 K/UL (ref 150–400)
PMV BLD AUTO: 10.3 FL (ref 8.9–12.9)
RBC # BLD AUTO: 3.86 M/UL (ref 3.8–5.2)
WBC # BLD AUTO: 8.7 K/UL (ref 3.6–11)

## 2024-06-02 PROCEDURE — 7210000100 HC LABOR FEE PER 1 HR

## 2024-06-02 PROCEDURE — 86901 BLOOD TYPING SEROLOGIC RH(D): CPT

## 2024-06-02 PROCEDURE — 36415 COLL VENOUS BLD VENIPUNCTURE: CPT

## 2024-06-02 PROCEDURE — 85025 COMPLETE CBC W/AUTO DIFF WBC: CPT

## 2024-06-02 PROCEDURE — 76815 OB US LIMITED FETUS(S): CPT

## 2024-06-02 PROCEDURE — 86900 BLOOD TYPING SEROLOGIC ABO: CPT

## 2024-06-02 PROCEDURE — 6370000000 HC RX 637 (ALT 250 FOR IP): Performed by: OBSTETRICS & GYNECOLOGY

## 2024-06-02 PROCEDURE — 3E0DXGC INTRODUCTION OF OTHER THERAPEUTIC SUBSTANCE INTO MOUTH AND PHARYNX, EXTERNAL APPROACH: ICD-10-PCS | Performed by: OBSTETRICS & GYNECOLOGY

## 2024-06-02 PROCEDURE — 80307 DRUG TEST PRSMV CHEM ANLYZR: CPT

## 2024-06-02 PROCEDURE — 1120000000 HC RM PRIVATE OB

## 2024-06-02 PROCEDURE — 86850 RBC ANTIBODY SCREEN: CPT

## 2024-06-02 PROCEDURE — 6360000002 HC RX W HCPCS: Performed by: OBSTETRICS & GYNECOLOGY

## 2024-06-02 RX ORDER — ONDANSETRON 2 MG/ML
4 INJECTION INTRAMUSCULAR; INTRAVENOUS EVERY 6 HOURS PRN
Status: DISCONTINUED | OUTPATIENT
Start: 2024-06-02 | End: 2024-06-03

## 2024-06-02 RX ORDER — SODIUM CHLORIDE, SODIUM LACTATE, POTASSIUM CHLORIDE, AND CALCIUM CHLORIDE .6; .31; .03; .02 G/100ML; G/100ML; G/100ML; G/100ML
1000 INJECTION, SOLUTION INTRAVENOUS PRN
Status: DISCONTINUED | OUTPATIENT
Start: 2024-06-02 | End: 2024-06-03

## 2024-06-02 RX ORDER — SODIUM CHLORIDE, SODIUM LACTATE, POTASSIUM CHLORIDE, CALCIUM CHLORIDE 600; 310; 30; 20 MG/100ML; MG/100ML; MG/100ML; MG/100ML
INJECTION, SOLUTION INTRAVENOUS CONTINUOUS
Status: DISCONTINUED | OUTPATIENT
Start: 2024-06-02 | End: 2024-06-03

## 2024-06-02 RX ORDER — TERBUTALINE SULFATE 1 MG/ML
0.25 INJECTION, SOLUTION SUBCUTANEOUS
Status: COMPLETED | OUTPATIENT
Start: 2024-06-02 | End: 2024-06-03

## 2024-06-02 RX ORDER — ONDANSETRON 4 MG/1
4 TABLET, ORALLY DISINTEGRATING ORAL EVERY 6 HOURS PRN
Status: DISCONTINUED | OUTPATIENT
Start: 2024-06-02 | End: 2024-06-03

## 2024-06-02 RX ORDER — METHYLERGONOVINE MALEATE 0.2 MG/ML
200 INJECTION INTRAVENOUS PRN
Status: DISCONTINUED | OUTPATIENT
Start: 2024-06-02 | End: 2024-06-03

## 2024-06-02 RX ORDER — SODIUM CHLORIDE 9 MG/ML
25 INJECTION, SOLUTION INTRAVENOUS PRN
Status: DISCONTINUED | OUTPATIENT
Start: 2024-06-02 | End: 2024-06-03

## 2024-06-02 RX ORDER — MISOPROSTOL 200 UG/1
400 TABLET ORAL PRN
Status: DISCONTINUED | OUTPATIENT
Start: 2024-06-02 | End: 2024-06-03

## 2024-06-02 RX ORDER — SODIUM CHLORIDE 0.9 % (FLUSH) 0.9 %
5-40 SYRINGE (ML) INJECTION PRN
Status: DISCONTINUED | OUTPATIENT
Start: 2024-06-02 | End: 2024-06-03

## 2024-06-02 RX ORDER — ACETAMINOPHEN 325 MG/1
650 TABLET ORAL EVERY 6 HOURS PRN
Status: ON HOLD | COMMUNITY
End: 2024-06-05 | Stop reason: HOSPADM

## 2024-06-02 RX ORDER — DIPHENHYDRAMINE HYDROCHLORIDE 50 MG/ML
25 INJECTION INTRAMUSCULAR; INTRAVENOUS ONCE
Status: COMPLETED | OUTPATIENT
Start: 2024-06-03 | End: 2024-06-02

## 2024-06-02 RX ORDER — SODIUM CHLORIDE 0.9 % (FLUSH) 0.9 %
5-40 SYRINGE (ML) INJECTION EVERY 12 HOURS SCHEDULED
Status: DISCONTINUED | OUTPATIENT
Start: 2024-06-02 | End: 2024-06-03

## 2024-06-02 RX ORDER — SODIUM CHLORIDE, SODIUM LACTATE, POTASSIUM CHLORIDE, AND CALCIUM CHLORIDE .6; .31; .03; .02 G/100ML; G/100ML; G/100ML; G/100ML
500 INJECTION, SOLUTION INTRAVENOUS PRN
Status: DISCONTINUED | OUTPATIENT
Start: 2024-06-02 | End: 2024-06-03

## 2024-06-02 RX ORDER — CARBOPROST TROMETHAMINE 250 UG/ML
250 INJECTION, SOLUTION INTRAMUSCULAR PRN
Status: DISCONTINUED | OUTPATIENT
Start: 2024-06-02 | End: 2024-06-03

## 2024-06-02 RX ADMIN — DIPHENHYDRAMINE HYDROCHLORIDE 25 MG: 50 INJECTION INTRAMUSCULAR; INTRAVENOUS at 23:43

## 2024-06-02 RX ADMIN — Medication 25 MCG: at 20:24

## 2024-06-02 NOTE — LACTATION NOTE
Discussed with mother her plan for feeding.  Reviewed the benefits of exclusive breast milk feeding during the hospital stay.  Informed mother of the risks of using formula to supplement in the first few days of life as well as the benefits of successful breast milk feeding. Mother acknowledges understanding of information reviewed and states that it is her plan to breast milk feed exclusively her infant.  Will support her choice and offer additional information as needed.

## 2024-06-02 NOTE — H&P
Long  STATION:  -3 cm  CONSISTENCY:  medium  POSITION:  posterior      Contraction frequency:  none  Membranes:  Intact    Pelvic Ultrasound:  Vtx by BSUS    General Labs:  CBC:   Lab Results   Component Value Date/Time    WBC 6.5 2024 12:00 AM    RBC 3.26 2024 12:00 AM    HGB 9.1 2024 12:00 AM    HCT 27.7 2024 12:00 AM    MCV 85 2024 12:00 AM    RDW 12.6 2024 12:00 AM     2024 12:00 AM       ASSESSMENT AND PLAN:    The patient is a 23 y.o.  1 parity 0 at 39 and 3/7 weeks    Plan: Cervical ripening with Cytotec and possible IC Balloon in AM. Plan per result.

## 2024-06-03 ENCOUNTER — ANESTHESIA (OUTPATIENT)
Dept: LABOR AND DELIVERY | Facility: HOSPITAL | Age: 24
DRG: 560 | End: 2024-06-03
Payer: MEDICAID

## 2024-06-03 ENCOUNTER — ANESTHESIA EVENT (OUTPATIENT)
Dept: LABOR AND DELIVERY | Facility: HOSPITAL | Age: 24
DRG: 560 | End: 2024-06-03
Payer: MEDICAID

## 2024-06-03 LAB
ABO + RH BLD: NORMAL
BLOOD GROUP ANTIBODIES SERPL: NORMAL
SPECIMEN EXP DATE BLD: NORMAL

## 2024-06-03 PROCEDURE — 2500000003 HC RX 250 WO HCPCS: Performed by: ANESTHESIOLOGY

## 2024-06-03 PROCEDURE — 0HQ9XZZ REPAIR PERINEUM SKIN, EXTERNAL APPROACH: ICD-10-PCS | Performed by: OBSTETRICS & GYNECOLOGY

## 2024-06-03 PROCEDURE — 3700000156 HC EPIDURAL ANESTHESIA

## 2024-06-03 PROCEDURE — 1120000000 HC RM PRIVATE OB

## 2024-06-03 PROCEDURE — 10907ZC DRAINAGE OF AMNIOTIC FLUID, THERAPEUTIC FROM PRODUCTS OF CONCEPTION, VIA NATURAL OR ARTIFICIAL OPENING: ICD-10-PCS | Performed by: OBSTETRICS & GYNECOLOGY

## 2024-06-03 PROCEDURE — 59200 INSERT CERVICAL DILATOR: CPT

## 2024-06-03 PROCEDURE — 7210000100 HC LABOR FEE PER 1 HR

## 2024-06-03 PROCEDURE — 7220000101 HC DELIVERY VAGINAL/SINGLE

## 2024-06-03 PROCEDURE — 6370000000 HC RX 637 (ALT 250 FOR IP): Performed by: OBSTETRICS & GYNECOLOGY

## 2024-06-03 PROCEDURE — 00HU33Z INSERTION OF INFUSION DEVICE INTO SPINAL CANAL, PERCUTANEOUS APPROACH: ICD-10-PCS | Performed by: ANESTHESIOLOGY

## 2024-06-03 PROCEDURE — 6360000002 HC RX W HCPCS

## 2024-06-03 PROCEDURE — 2580000003 HC RX 258: Performed by: OBSTETRICS & GYNECOLOGY

## 2024-06-03 PROCEDURE — 6360000002 HC RX W HCPCS: Performed by: OBSTETRICS & GYNECOLOGY

## 2024-06-03 PROCEDURE — 51702 INSERT TEMP BLADDER CATH: CPT

## 2024-06-03 RX ORDER — SODIUM CHLORIDE 0.9 % (FLUSH) 0.9 %
5-40 SYRINGE (ML) INJECTION EVERY 12 HOURS SCHEDULED
Status: DISCONTINUED | OUTPATIENT
Start: 2024-06-03 | End: 2024-06-05 | Stop reason: HOSPADM

## 2024-06-03 RX ORDER — ACETAMINOPHEN 500 MG
1000 TABLET ORAL EVERY 8 HOURS SCHEDULED
Status: DISCONTINUED | OUTPATIENT
Start: 2024-06-03 | End: 2024-06-05 | Stop reason: HOSPADM

## 2024-06-03 RX ORDER — EPHEDRINE SULFATE/0.9% NACL/PF 25 MG/5 ML
10 SYRINGE (ML) INTRAVENOUS ONCE
Status: COMPLETED | OUTPATIENT
Start: 2024-06-03 | End: 2024-06-03

## 2024-06-03 RX ORDER — ONDANSETRON 2 MG/ML
INJECTION INTRAMUSCULAR; INTRAVENOUS
Status: COMPLETED
Start: 2024-06-03 | End: 2024-06-03

## 2024-06-03 RX ORDER — BUPIVACAINE HYDROCHLORIDE AND EPINEPHRINE 2.5; 5 MG/ML; UG/ML
INJECTION, SOLUTION EPIDURAL; INFILTRATION; INTRACAUDAL; PERINEURAL PRN
Status: DISCONTINUED | OUTPATIENT
Start: 2024-06-03 | End: 2024-06-03 | Stop reason: SDUPTHER

## 2024-06-03 RX ORDER — CARBOPROST TROMETHAMINE 250 UG/ML
250 INJECTION, SOLUTION INTRAMUSCULAR PRN
Status: DISCONTINUED | OUTPATIENT
Start: 2024-06-03 | End: 2024-06-05 | Stop reason: HOSPADM

## 2024-06-03 RX ORDER — FENTANYL CITRATE 50 UG/ML
25 INJECTION, SOLUTION INTRAMUSCULAR; INTRAVENOUS ONCE
Status: COMPLETED | OUTPATIENT
Start: 2024-06-03 | End: 2024-06-03

## 2024-06-03 RX ORDER — NALOXONE HYDROCHLORIDE 0.4 MG/ML
INJECTION, SOLUTION INTRAMUSCULAR; INTRAVENOUS; SUBCUTANEOUS PRN
Status: DISCONTINUED | OUTPATIENT
Start: 2024-06-03 | End: 2024-06-03

## 2024-06-03 RX ORDER — SODIUM CHLORIDE 9 MG/ML
INJECTION, SOLUTION INTRAVENOUS PRN
Status: DISCONTINUED | OUTPATIENT
Start: 2024-06-03 | End: 2024-06-05 | Stop reason: HOSPADM

## 2024-06-03 RX ORDER — METHYLERGONOVINE MALEATE 0.2 MG/ML
200 INJECTION INTRAVENOUS PRN
Status: DISCONTINUED | OUTPATIENT
Start: 2024-06-03 | End: 2024-06-05 | Stop reason: HOSPADM

## 2024-06-03 RX ORDER — MISOPROSTOL 200 UG/1
200 TABLET ORAL PRN
Status: DISCONTINUED | OUTPATIENT
Start: 2024-06-03 | End: 2024-06-05 | Stop reason: HOSPADM

## 2024-06-03 RX ORDER — IBUPROFEN 400 MG/1
800 TABLET ORAL EVERY 8 HOURS SCHEDULED
Status: DISCONTINUED | OUTPATIENT
Start: 2024-06-04 | End: 2024-06-05 | Stop reason: HOSPADM

## 2024-06-03 RX ORDER — MISOPROSTOL 200 UG/1
800 TABLET ORAL PRN
Status: DISCONTINUED | OUTPATIENT
Start: 2024-06-03 | End: 2024-06-03

## 2024-06-03 RX ORDER — ONDANSETRON 2 MG/ML
4 INJECTION INTRAMUSCULAR; INTRAVENOUS ONCE
Status: DISCONTINUED | OUTPATIENT
Start: 2024-06-03 | End: 2024-06-05 | Stop reason: HOSPADM

## 2024-06-03 RX ORDER — DOCUSATE SODIUM 100 MG/1
100 CAPSULE, LIQUID FILLED ORAL 2 TIMES DAILY
Status: DISCONTINUED | OUTPATIENT
Start: 2024-06-03 | End: 2024-06-05 | Stop reason: HOSPADM

## 2024-06-03 RX ORDER — OXYCODONE HYDROCHLORIDE 5 MG/1
5 TABLET ORAL EVERY 4 HOURS PRN
Status: DISCONTINUED | OUTPATIENT
Start: 2024-06-03 | End: 2024-06-05 | Stop reason: HOSPADM

## 2024-06-03 RX ORDER — SODIUM CHLORIDE 0.9 % (FLUSH) 0.9 %
5-40 SYRINGE (ML) INJECTION PRN
Status: DISCONTINUED | OUTPATIENT
Start: 2024-06-03 | End: 2024-06-05 | Stop reason: HOSPADM

## 2024-06-03 RX ORDER — OXYCODONE HYDROCHLORIDE 5 MG/1
10 TABLET ORAL EVERY 4 HOURS PRN
Status: DISCONTINUED | OUTPATIENT
Start: 2024-06-03 | End: 2024-06-05 | Stop reason: HOSPADM

## 2024-06-03 RX ORDER — MISOPROSTOL 200 UG/1
800 TABLET ORAL PRN
Status: DISCONTINUED | OUTPATIENT
Start: 2024-06-03 | End: 2024-06-05 | Stop reason: HOSPADM

## 2024-06-03 RX ORDER — FENTANYL/BUPIVACAINE/NS/PF 2-1250MCG
1-15 PLASTIC BAG, INJECTION (ML) INJECTION CONTINUOUS
Status: DISCONTINUED | OUTPATIENT
Start: 2024-06-03 | End: 2024-06-03

## 2024-06-03 RX ORDER — BUPIVACAINE HYDROCHLORIDE 2.5 MG/ML
INJECTION, SOLUTION EPIDURAL; INFILTRATION; INTRACAUDAL
Status: DISCONTINUED
Start: 2024-06-03 | End: 2024-06-03

## 2024-06-03 RX ORDER — LANOLIN/MINERAL OIL
LOTION (ML) TOPICAL PRN
Status: DISCONTINUED | OUTPATIENT
Start: 2024-06-03 | End: 2024-06-05 | Stop reason: HOSPADM

## 2024-06-03 RX ADMIN — TERBUTALINE SULFATE 0.25 MG: 1 INJECTION, SOLUTION SUBCUTANEOUS at 09:29

## 2024-06-03 RX ADMIN — SODIUM CHLORIDE, PRESERVATIVE FREE 10 ML: 5 INJECTION INTRAVENOUS at 02:09

## 2024-06-03 RX ADMIN — Medication 25 MCG: at 04:40

## 2024-06-03 RX ADMIN — SODIUM CHLORIDE, POTASSIUM CHLORIDE, SODIUM LACTATE AND CALCIUM CHLORIDE: 600; 310; 30; 20 INJECTION, SOLUTION INTRAVENOUS at 07:27

## 2024-06-03 RX ADMIN — SODIUM CHLORIDE, PRESERVATIVE FREE 10 ML: 5 INJECTION INTRAVENOUS at 06:31

## 2024-06-03 RX ADMIN — SODIUM CHLORIDE, PRESERVATIVE FREE 10 ML: 5 INJECTION INTRAVENOUS at 00:45

## 2024-06-03 RX ADMIN — SODIUM CHLORIDE, POTASSIUM CHLORIDE, SODIUM LACTATE AND CALCIUM CHLORIDE: 600; 310; 30; 20 INJECTION, SOLUTION INTRAVENOUS at 11:34

## 2024-06-03 RX ADMIN — ONDANSETRON 4 MG: 2 INJECTION INTRAMUSCULAR; INTRAVENOUS at 21:30

## 2024-06-03 RX ADMIN — Medication 25 MCG: at 00:40

## 2024-06-03 RX ADMIN — BUPIVACAINE HYDROCHLORIDE AND EPINEPHRINE 6 ML: 2.5; 5 INJECTION, SOLUTION EPIDURAL; INFILTRATION; INTRACAUDAL; PERINEURAL at 08:37

## 2024-06-03 RX ADMIN — OXYTOCIN 87.3 MILLI-UNITS/MIN: 10 INJECTION INTRAVENOUS at 20:03

## 2024-06-03 RX ADMIN — Medication 166.7 ML: at 20:03

## 2024-06-03 RX ADMIN — BUPIVACAINE HYDROCHLORIDE AND EPINEPHRINE 0.4 ML: 2.5; 5 INJECTION, SOLUTION EPIDURAL; INFILTRATION; INTRACAUDAL; PERINEURAL at 08:36

## 2024-06-03 RX ADMIN — FENTANYL CITRATE 25 MCG: 50 INJECTION INTRAMUSCULAR; INTRAVENOUS at 05:07

## 2024-06-03 RX ADMIN — Medication 12 ML/HR: at 09:08

## 2024-06-03 RX ADMIN — SODIUM CHLORIDE, PRESERVATIVE FREE 10 ML: 5 INJECTION INTRAVENOUS at 07:25

## 2024-06-03 RX ADMIN — FENTANYL CITRATE 25 MCG: 50 INJECTION INTRAMUSCULAR; INTRAVENOUS at 06:31

## 2024-06-03 RX ADMIN — Medication 12 ML/HR: at 16:48

## 2024-06-03 RX ADMIN — FENTANYL CITRATE 25 MCG: 50 INJECTION INTRAMUSCULAR; INTRAVENOUS at 00:39

## 2024-06-03 RX ADMIN — SODIUM CHLORIDE, POTASSIUM CHLORIDE, SODIUM LACTATE AND CALCIUM CHLORIDE: 600; 310; 30; 20 INJECTION, SOLUTION INTRAVENOUS at 19:41

## 2024-06-03 RX ADMIN — ONDANSETRON 4 MG: 2 INJECTION INTRAMUSCULAR; INTRAVENOUS at 02:07

## 2024-06-03 RX ADMIN — SODIUM CHLORIDE, PRESERVATIVE FREE 10 ML: 5 INJECTION INTRAVENOUS at 05:07

## 2024-06-03 RX ADMIN — SODIUM CHLORIDE, POTASSIUM CHLORIDE, SODIUM LACTATE AND CALCIUM CHLORIDE 1000 ML: 600; 310; 30; 20 INJECTION, SOLUTION INTRAVENOUS at 08:02

## 2024-06-03 RX ADMIN — EPHEDRINE SULFATE 10 MG: 5 INJECTION INTRAVENOUS at 08:46

## 2024-06-03 ASSESSMENT — PAIN DESCRIPTION - LOCATION
LOCATION: ABDOMEN

## 2024-06-03 ASSESSMENT — PAIN - FUNCTIONAL ASSESSMENT: PAIN_FUNCTIONAL_ASSESSMENT: ACTIVITIES ARE NOT PREVENTED

## 2024-06-03 ASSESSMENT — PAIN DESCRIPTION - DESCRIPTORS
DESCRIPTORS: CRAMPING

## 2024-06-03 ASSESSMENT — PAIN SCALES - GENERAL
PAINLEVEL_OUTOF10: 5
PAINLEVEL_OUTOF10: 10

## 2024-06-03 NOTE — ANESTHESIA PROCEDURE NOTES
CSE Block    Patient location during procedure: OB  Start time: 6/3/2024 8:26 AM  End time: 6/3/2024 8:39 AM  Reason for block: labor epidural  Staffing  Performed: anesthesiologist   Anesthesiologist: Riccardo Yeh MD  Performed by: Riccardo Yeh MD  Authorized by: Riccardo Yeh MD    CSE  Patient position: sitting  Prep: ChloraPrep  Patient monitoring: continuous pulse ox and frequent blood pressure checks  Approach: midline  Provider prep: mask and sterile gloves  Spinal Needle  Needle gauge: 25 G  Epidural Needle  Injection technique: MADDI air  Needle type: Tuohy   Needle gauge: 17 G  Needle insertion depth: 4 cm  Location: lumbar (1-5)  Catheter  Catheter at skin depth: 8 cm  Assessment  Hemodynamics: stable  Preanesthetic Checklist  Completed: patient identified, IV checked, risks and benefits discussed, surgical/procedural consents, equipment checked, pre-op evaluation, timeout performed, anesthesia consent given, oxygen available and monitors applied/VS acknowledged

## 2024-06-03 NOTE — ANESTHESIA PRE PROCEDURE
Department of Anesthesiology  Preprocedure Note       Name:  Jessy Sanchez   Age:  23 y.o.  :  2000                                          MRN:  [id number]         Date:  6/3/2024      Surgeon: * No surgeons listed *    Procedure: * No procedures listed *    Medications prior to admission:   Prior to Admission medications    Medication Sig Start Date End Date Taking? Authorizing Provider   acetaminophen (TYLENOL) 325 MG tablet Take 2 tablets by mouth every 6 hours as needed for Pain   Yes Provider, MD Tania   glucose monitoring kit 1 kit by Does not apply route daily  Patient not taking: Reported on 2024   Isabelle Glover MD   Lancets MISC 1 each by Does not apply route daily  Patient not taking: Reported on 2024   Isabelle Glover MD   blood glucose monitor strips Test 4 times a day & as needed for symptoms of irregular blood glucose. Dispense sufficient amount for indicated testing frequency plus additional to accommodate PRN testing needs.  Patient not taking: Reported on 2024   Isabelle Glover MD   ferrous sulfate (FE TABS 325) 325 (65 Fe) MG EC tablet Take 1 tablet by mouth daily (with breakfast)  Patient not taking: Reported on 2024 3/21/24   Isabelle Glover MD   famotidine (PEPCID) 20 MG tablet Take 1 tablet by mouth daily  Patient not taking: Reported on 2024   Isabelle Glover MD   ondansetron (ZOFRAN) 4 MG tablet Take 1 tablet by mouth daily as needed for Nausea or Vomiting 24   Isabelle Glover MD   Prenatal Vit-Fe Fumarate-FA (PRENATAL VITAMIN) 27-0.8 MG TABS Take 1 tablet by mouth daily  Patient not taking: Reported on 2024   Isabelle Glover MD       Current medications:    Current Facility-Administered Medications   Medication Dose Route Frequency Provider Last Rate Last Admin    BUPivacaine (PF) (MARCAINE) 0.25 % injection             lactated ringers IV soln infusion   IntraVENous Continuous Daniel Barker

## 2024-06-04 LAB
BASOPHILS # BLD: 0 K/UL (ref 0–0.1)
BASOPHILS NFR BLD: 0 % (ref 0–1)
DIFFERENTIAL METHOD BLD: ABNORMAL
EOSINOPHIL # BLD: 0 K/UL (ref 0–0.4)
EOSINOPHIL NFR BLD: 0 % (ref 0–7)
ERYTHROCYTE [DISTWIDTH] IN BLOOD BY AUTOMATED COUNT: 17 % (ref 11.5–14.5)
HCT VFR BLD AUTO: 28.9 % (ref 35–47)
HGB BLD-MCNC: 8.5 G/DL (ref 11.5–16)
IMM GRANULOCYTES # BLD AUTO: 0.1 K/UL (ref 0–0.04)
IMM GRANULOCYTES NFR BLD AUTO: 1 % (ref 0–0.5)
LYMPHOCYTES # BLD: 1.8 K/UL (ref 0.8–3.5)
LYMPHOCYTES NFR BLD: 14 % (ref 12–49)
MCH RBC QN AUTO: 23.5 PG (ref 26–34)
MCHC RBC AUTO-ENTMCNC: 29.4 G/DL (ref 30–36.5)
MCV RBC AUTO: 79.8 FL (ref 80–99)
MONOCYTES # BLD: 1 K/UL (ref 0–1)
MONOCYTES NFR BLD: 8 % (ref 5–13)
NEUTS SEG # BLD: 10.1 K/UL (ref 1.8–8)
NEUTS SEG NFR BLD: 77 % (ref 32–75)
NRBC # BLD: 0 K/UL (ref 0–0.01)
NRBC BLD-RTO: 0 PER 100 WBC
PLATELET # BLD AUTO: 294 K/UL (ref 150–400)
PMV BLD AUTO: 9.6 FL (ref 8.9–12.9)
RBC # BLD AUTO: 3.62 M/UL (ref 3.8–5.2)
WBC # BLD AUTO: 13 K/UL (ref 3.6–11)

## 2024-06-04 PROCEDURE — 36415 COLL VENOUS BLD VENIPUNCTURE: CPT

## 2024-06-04 PROCEDURE — 6370000000 HC RX 637 (ALT 250 FOR IP): Performed by: OBSTETRICS & GYNECOLOGY

## 2024-06-04 PROCEDURE — 85025 COMPLETE CBC W/AUTO DIFF WBC: CPT

## 2024-06-04 PROCEDURE — 1120000000 HC RM PRIVATE OB

## 2024-06-04 RX ADMIN — IBUPROFEN 800 MG: 400 TABLET, FILM COATED ORAL at 09:41

## 2024-06-04 RX ADMIN — DOCUSATE SODIUM 100 MG: 100 CAPSULE, LIQUID FILLED ORAL at 21:09

## 2024-06-04 RX ADMIN — IBUPROFEN 800 MG: 400 TABLET, FILM COATED ORAL at 18:23

## 2024-06-04 RX ADMIN — DOCUSATE SODIUM 100 MG: 100 CAPSULE, LIQUID FILLED ORAL at 09:41

## 2024-06-04 RX ADMIN — ACETAMINOPHEN 1000 MG: 500 TABLET ORAL at 00:17

## 2024-06-04 RX ADMIN — ACETAMINOPHEN 1000 MG: 500 TABLET ORAL at 21:08

## 2024-06-04 ASSESSMENT — PAIN SCALES - GENERAL
PAINLEVEL_OUTOF10: 0
PAINLEVEL_OUTOF10: 0
PAINLEVEL_OUTOF10: 3

## 2024-06-04 ASSESSMENT — PAIN DESCRIPTION - ORIENTATION
ORIENTATION: LOWER
ORIENTATION: LOWER;MID

## 2024-06-04 ASSESSMENT — PAIN - FUNCTIONAL ASSESSMENT: PAIN_FUNCTIONAL_ASSESSMENT: ACTIVITIES ARE NOT PREVENTED

## 2024-06-04 ASSESSMENT — PAIN DESCRIPTION - LOCATION
LOCATION: COCCYX
LOCATION: ABDOMEN

## 2024-06-04 ASSESSMENT — PAIN DESCRIPTION - DESCRIPTORS
DESCRIPTORS: SORE
DESCRIPTORS: CRAMPING;DISCOMFORT

## 2024-06-04 NOTE — LACTATION NOTE
This note was copied from a baby's chart.     24 1500   Visit Information   Lactation Consult Visit Type IP Consult Follow Up   Visit Length 30 minutes   Referral Received From Lactation Consultant Follow-up   Reason for Visit Normal  Visit;Education   Breast Feeding History/Assessment   Left Breast Soft  (Colostrum easily expressed)   Left Nipple Protrude   Right Nipple Protrude;Sore   Right Breast Soft  (Colostrum easily expressed)   Breastfeeding History No   Left Side Feeding   Infant Latch Observations Rooting;Wide open mouth;Good latch on;Sustained rhythmic suck   Infant Position Cross cradle  (Laid back)   Infant Response to Feeding Feeding well   LATCH Documentation   Latch 2   Audible Swallowing 1   Type of Nipple 2   Comfort (Breast/Nipple) 2   Hold (Positioning) 1   LATCH Score 8   Care Plan/Breast Care   Breast Care Lanolin provided   Lactation Comment Baby more alert this feeding and showing good signs of transfer. Mother reluctant to latch baby on the right breast due to soreness. Demonstrated hand expression of colostrum and applying to her sore nipple. Demonstrated use of the hand pump. Discussed pumping the right breast if baby is not latching.  Equipment: Faxed request for insurance breast pump; Measured for 21mm flanges; Provided with hand pump and 21mm flange   oral assessment WDL  Provided pacifier education     Reviewed the \"Your Guide to Breastfeeding\" booklet. Discussed the typical feeding characteristics in the 1st and 2nd DOL and signs of adequate intake. Demonstrated the asymmetric latch and observed baby showing good signs of transfer on the breast. Discussed a feeding plan and mother's questions were addressed.    Plan:  Offer lots of skin to skin and access to the breast.  Feed baby at early signs of hunger every 2-3 hours.  Assure a deep latch, check that baby's lips are turned outward and use breast compression to keep baby actively feeding.  Pump/hand express for

## 2024-06-04 NOTE — LACTATION NOTE
This note was copied from a baby's chart.     24 1015   Visit Information   Lactation Consult Visit Type IP Initial Consult   Visit Length 30 minutes   Reason for Visit Normal  Visit;Education   Breast Feeding History/Assessment   Left Breast Soft   Left Nipple Protrude   Right Nipple Protrude   Right Breast Soft   Breastfeeding History No   Left Side Feeding   Infant Latch Observations Rooting;Wide open mouth;Good latch on;Sustained rhythmic suck   Infant Position Cross cradle  (Laid back)   Infant Response to Feeding Feeding well   LATCH Documentation   Latch 2   Audible Swallowing 1   Type of Nipple 2   Comfort (Breast/Nipple) 2   Hold (Positioning) 1   LATCH Score 8   Care Plan/Breast Care   Breast Care Lanolin provided   Lactation Comment Baby is about 14 hours old at this time. Recieved a feeding on donor milk over the night due to sleepiness. Demonstrated hand expression and techniques to deepen the latch and keep baby actively feeding. Baby sleepy and mother has visitors. Will return at the next feeding.

## 2024-06-04 NOTE — ANESTHESIA POSTPROCEDURE EVALUATION
Department of Anesthesiology  Postprocedure Note    Patient: Jessy Sanchez  MRN: [id number]  YOB: 2000  Date of evaluation: 6/3/2024    Procedure Summary       Date: 06/03/24 Room / Location:     Anesthesia Start: 0826 Anesthesia Stop: 1956    Procedure: Labor Analgesia Diagnosis:     Scheduled Providers:  Responsible Provider: Ry Cabrera MD    Anesthesia Type: CSE ASA Status: 2            Anesthesia Type: No value filed.    Stuart Phase I:      Stuart Phase II:      Anesthesia Post Evaluation    No notable events documented.

## 2024-06-04 NOTE — L&D DELIVERY NOTE
23 y.o.  at 39w4d  eIOL with Cytotec, Cook balloon then AROM progressed normally in active phase of labor, pushing >1 hour. Delivered a term born live female infant over an intact perineum from OA. No nuchal cords. Placenta delivered intact. Small superficial inner labia non bleeding tears, unrepaired. EBL 100cc.          Norman Sanchez [[id number]]      Labor Events     Labor: No   Steroids: None  Cervical Ripening Date/Time:  24 20:24:00   Cervical Ripening Type: Misoprostol  Antibiotics Received during Labor: No  Rupture Identifier: Sac 1  Rupture Date/Time:  6/3/24 16:23:00   Rupture Type: AROM, Intact  Fluid Color: Clear  Fluid Odor: None  Fluid Volume: Moderate  Induction: Cervical Ripening Balloon, Misoprostol  Augmentation: None  Labor Complications: None              Anesthesia    Method: Epidural       Labor Event Times      Labor onset date/time:        Dilation complete date/time:  6/3/24 18:14:00 EDT     Start pushing date/time:  6/3/2024 18:20:03   Decision date/time (emergent ):            Labor Length    2nd stage: 1h 42m  3rd stage: 0h 07m       Delivery Details      Delivery Date: 6/3/24 Delivery Time: 19:56:20   Delivery Type: Vaginal, Spontaneous              [place] Presentation    Presentation: Vertex  _: Occiput       Shoulder Dystocia    Shoulder Dystocia Present?: No       Assisted Delivery Details    Forceps Attempted?: No  Vacuum Extractor Attempted?: No                           Cord    Vessels: 3 Vessels  Complications: None  Delayed Cord Clamping?: Yes  Cord Clamped Date/Time: 6/3/2024 19:57:00  Cord Blood Disposition: Lab  Gases Sent?: No              Placenta    Date/Time: 6/3/2024 20:03:45  Removal: Expressed  Appearance: Intact  Disposition: Discarded       Lacerations    Episiotomy: None  Perineal Lacerations: 1st  Other Lacerations: labial laceration  Labial Laceration: left Repaired?: No          Vaginal Counts    Initial Count Personnel:

## 2024-06-05 VITALS
BODY MASS INDEX: 23.78 KG/M2 | SYSTOLIC BLOOD PRESSURE: 101 MMHG | WEIGHT: 130 LBS | TEMPERATURE: 98.3 F | RESPIRATION RATE: 18 BRPM | HEART RATE: 65 BPM | DIASTOLIC BLOOD PRESSURE: 58 MMHG | OXYGEN SATURATION: 99 %

## 2024-06-05 PROCEDURE — 6370000000 HC RX 637 (ALT 250 FOR IP): Performed by: OBSTETRICS & GYNECOLOGY

## 2024-06-05 RX ORDER — DOCUSATE SODIUM 100 MG/1
100 CAPSULE, LIQUID FILLED ORAL DAILY PRN
Qty: 30 CAPSULE | Refills: 0 | Status: SHIPPED | OUTPATIENT
Start: 2024-06-05

## 2024-06-05 RX ORDER — ACETAMINOPHEN 500 MG
500 TABLET ORAL 4 TIMES DAILY PRN
Qty: 30 TABLET | Refills: 1 | Status: SHIPPED | OUTPATIENT
Start: 2024-06-05

## 2024-06-05 RX ORDER — FERROUS SULFATE 325(65) MG
325 TABLET ORAL
Qty: 90 TABLET | Refills: 1 | Status: SHIPPED | OUTPATIENT
Start: 2024-06-05

## 2024-06-05 RX ORDER — IBUPROFEN 800 MG/1
800 TABLET ORAL EVERY 8 HOURS SCHEDULED
Qty: 30 TABLET | Refills: 1 | Status: SHIPPED | OUTPATIENT
Start: 2024-06-05

## 2024-06-05 RX ADMIN — DOCUSATE SODIUM 100 MG: 100 CAPSULE, LIQUID FILLED ORAL at 07:57

## 2024-06-05 NOTE — PLAN OF CARE
Problem: Vaginal Birth or  Section  Goal: Fetal and maternal status remain reassuring during the birth process  Description:  Birth OB-Pregnancy care plan goal which identifies if the fetal and maternal status remain reassuring during the birth process  2024 by Jose Lopez RN  Outcome: Progressing  2024 by Analy Barragan RN  Outcome: Progressing  2024 by Kelly Acosta RN  Outcome: Progressing     Problem: Postpartum  Goal: Experiences normal postpartum course  Description:  Postpartum OB-Pregnancy care plan goal which identifies if the mother is experiencing a normal postpartum course  2024 by Jose Lopez RN  Outcome: Progressing  2024 by Analy Barragan RN  Outcome: Progressing  2024 by Kelly Acosta RN  Outcome: Progressing  Goal: Appropriate maternal -  bonding  Description:  Postpartum OB-Pregnancy care plan goal which identifies if the mother and  are bonding appropriately  2024 by Analy Barragan RN  Outcome: Progressing  2024 by Kelly Acosta RN  Outcome: Progressing  Goal: Establishment of infant feeding pattern  Description:  Postpartum OB-Pregnancy care plan goal which identifies if the mother is establishing a feeding pattern with their   2024 by Analy Barragan RN  Outcome: Progressing  2024 by Kelly Acosta RN  Outcome: Progressing  Goal: Incisions, wounds, or drain sites healing without S/S of infection  2024 by Analy Barragan RN  Outcome: Progressing  2024 by Kelly Acosta RN  Outcome: Progressing  Flowsheets  Taken 2024 0810 by Kelly Acosta RN  Incisions, Wounds, or Drain Sites Healing Without Sign and Symptoms of Infection: TWICE DAILY: Assess and document skin integrity  Taken 2024 0018 by Nancy Ramos RN  Incisions, Wounds, or Drain Sites Healing Without Sign and Symptoms of Infection: TWICE DAILY: Assess and document skin integrity   
hospitalization:   Assess and monitor for signs and symptoms of infection   Monitor lab/diagnostic results   Monitor all insertion sites i.e., indwelling lines, tubes and drains  6/3/2024 2256 by Augie Goode, RN  Outcome: Progressing     Problem: Safety - Adult  Goal: Free from fall injury  6/4/2024 0953 by Kelly Acosta, RN  Outcome: Progressing  Flowsheets  Taken 6/4/2024 0810 by Kelly Acosta, RN  Free From Fall Injury: Instruct family/caregiver on patient safety  Taken 6/4/2024 0018 by Nancy Ramos, RN  Free From Fall Injury: Instruct family/caregiver on patient safety  6/3/2024 2256 by Augie Goode, RN  Outcome: Progressing  Flowsheets (Taken 6/3/2024 1515 by Sherine Pryor, RN)  Free From Fall Injury:   Instruct family/caregiver on patient safety   Based on caregiver fall risk screen, instruct family/caregiver to ask for assistance with transferring infant if caregiver noted to have fall risk factors

## 2024-06-05 NOTE — DISCHARGE SUMMARY
blood glucose monitor strips Comments:   Reason for Stopping:         ondansetron (ZOFRAN) 4 MG tablet Comments:   Reason for Stopping:               Disposition at Discharge: Home or self care    Condition at Discharge: Stable    Reference my discharge instructions.    No follow-ups on file.     Signed By:  Amparo Emerson MD     June 5, 2024

## 2024-06-05 NOTE — PROGRESS NOTES
0715: Bedside and Verbal shift change report given to BLAISE Pryor RN (oncoming nurse) by GERARDO Goode RN (offgoing nurse). Report included the following information Nurse Handoff Report and MAR.     6991-2551: Undetermined deceleration noted due to tracing inconsistencies, patient turned to left side and EFM placed, 130bpm noted by RN. Novii battery changed and re-tested.     0753: Patient up to bathroom.    0759: Patient back in bed turned on right side.    4047-4366: Patient positioned to side of the bed for epidural placement. Difficulty monitoring with NOVII due to maternal position.    0814: Anesthesia called for epidural placement.    0828: Anesthesia at bedside at 0828. Continued difficulty tracing due to maternal position. Time out completed at 0829. Successful epidural is completed by Dr Yeh. Patient is repositioned supine in bed with wedge under right hip. Blood pressure frequency increased.     0840: Anesthesia left bedside.    0917: Sawyer catheter placed and epidural continuous infusion is started.    0925: Dr Almanzar notified regarding tachy uterine pattern and late decelerations. MD advised will come to bedside to assess.    0927: Dr Almanzar at bedside. SVE performed with patient's consent and EFM tracing strip reviewed by MD. MD advised to give dose of terb.    0929: Terb administered and dose verified with TIFFANIE Esteban RN. Patient repositioned to left side.    1122: Dr Almanzar at bedside to assess. SVE performed with patient's consent. Cervical exam remained unchanged. MD will place CRB. EFM tracing reviewed by MD.    1127: CRB placed by Dr Almanzar.    1612: CRB fell out. Dr Almanzar notified. MD will come to bedside to assess.    1623: Dr Almanzar at bedside to assess. SVE performed with patient's consent. AROM performed and EFM tracing reviewed by MD.    1814: Deceleration noted, MD requested at bedside. Dr Leyva at bedside to assess. EFM tracing reviewed by MD. SVE performed with patient's consent. 
2329: Telephoned Dr. Barker regarding patient's requests for sleep aid. 25 mg I.V benadryl ordered.     0024: Telephoned Dr. Barker regarding patient's request for pain medication. Orders received see MAR.     0617: Telephoned Dr. Barker regarding patient's request for more pain medication. Orders received. See MAR.   
5:20 PM  Pt arrived for scheduled elective induction with VWC at 39w3d.    6:15 PM  Dr Barker in to assess pt. SVE performed, cervix closed. Bedside US performed to confirm vertex position. POC discussed with pt.     7:15 PM  Care turned over to BILL Goode RN.  
: Bedside and Verbal shift change report given to GERARDO Goode RN (oncoming nurse) by BLAISE Pryor RN (offgoing nurse). Report included the following information Nurse Handoff Report, ED SBAR, Adult Overview, and MAR.     : RN remained at bedside throughout pushing. EFM continuously assessed.  of live baby girl by Dr. Leyva at .     2350: Two RN assist to bathroom. Tolerated well.     
Balloon came out spontaneously.  Sve 6//100/-2  Arom 600 cc clear fluid  Category 1 tracing  Continue expectant management (not on pitocin.)  
Bedside and Verbal shift change report given to KIERA Hoang (oncoming nurse) by Jamarcus (offgoing nurse). Report included the following information Nurse Handoff Report, Intake/Output, MAR, and Recent Results    1305:  Reviewed discharge instructions with patient.  Discharge papers given to patient.  No questions at this time.         
NST:    Baseline: 130    Variability: Moderate    Accelerations: Two 15 x15 accelerations in a ten minute window.    Decelerations: None    Contractions: None    Cat 1, RNST    This test was preformed under my supervision and interpreted by me.    Daniel Barker MD  
Post-Partum Day Number 1 Progress Note    Jessy Sanchez     Assessment: Doing well, post partum day 1 sp  after eIOL    Plan:  - Continue routine postpartum and perineal care as well as maternal education.  - hypotension - this am bps 80s-90s/50s, pulse 70s. Asx. Will check cbc.---> hgb returned 8.5 from 9.3. will need iron on dc.  - Plan discharge home tomorrow.    Information for the patient's :  Norman Sanchez [[id number]]   Vaginal, Spontaneous  Patient doing well without significant complaint.  Voiding without difficulty, normal lochia.    Vitals:  BP (!) 91/55   Pulse 70   Temp 97.9 °F (36.6 °C) (Oral)   Resp 16   Wt 59 kg (130 lb)   SpO2 99%   Breastfeeding Unknown   BMI 23.78 kg/m²   Temp (24hrs), Av.8 °F (37.1 °C), Min:97.9 °F (36.6 °C), Max:99.7 °F (37.6 °C)        Exam:   Patient without distress.                  Fundus firm, nontender per nursing fundal checks.                Perineum with normal lochia noted per nursing assessment.                Lower extremities are negative for pathological edema.    Labs:     Lab Results   Component Value Date/Time    WBC 8.7 2024 06:23 PM    WBC 6.5 2024 12:00 AM    WBC 4.9 2023 12:00 AM    WBC 6.3 2023 07:46 PM    WBC 11.7 10/09/2020 10:27 PM    HGB 9.3 2024 06:23 PM    HGB 9.1 2024 12:00 AM    HGB 12.7 2023 12:00 AM    HGB 13.4 2023 07:46 PM    HGB 13.9 10/09/2020 10:27 PM    HCT 31.2 2024 06:23 PM    HCT 27.7 2024 12:00 AM    HCT 37.6 2023 12:00 AM    HCT 40.9 2023 07:46 PM    HCT 44.2 10/09/2020 10:27 PM     2024 06:23 PM     2024 12:00 AM     2023 12:00 AM     2023 07:46 PM     10/09/2020 10:27 PM       No results found for this or any previous visit (from the past 24 hour(s)).       
This am I was called to see patient for tachysystole with late decelerations.  [place] showed ctx q 2 min with repetitive late decelerations but good variability.  Pt was comfortable with epidural.  Pt given terbutaline and ctx spaced to q 4 min and return to category 1 tracing.  SVE was 1/70/-1 during the tachysystole.  90 min later was 1/50-3.  [place] cervical ripening balloon placed with 60/60 fluid.  Will monitor ctx.  Can start pitocin if they space out.  
Range    WBC 13.0 (H) 3.6 - 11.0 K/uL    RBC 3.62 (L) 3.80 - 5.20 M/uL    Hemoglobin 8.5 (L) 11.5 - 16.0 g/dL    Hematocrit 28.9 (L) 35.0 - 47.0 %    MCV 79.8 (L) 80.0 - 99.0 FL    MCH 23.5 (L) 26.0 - 34.0 PG    MCHC 29.4 (L) 30.0 - 36.5 g/dL    RDW 17.0 (H) 11.5 - 14.5 %    Platelets 294 150 - 400 K/uL    MPV 9.6 8.9 - 12.9 FL    Nucleated RBCs 0.0 0  WBC    nRBC 0.00 0.00 - 0.01 K/uL    Neutrophils % 77 (H) 32 - 75 %    Lymphocytes % 14 12 - 49 %    Monocytes % 8 5 - 13 %    Eosinophils % 0 0 - 7 %    Basophils % 0 0 - 1 %    Immature Granulocytes % 1 (H) 0.0 - 0.5 %    Neutrophils Absolute 10.1 (H) 1.8 - 8.0 K/UL    Lymphocytes Absolute 1.8 0.8 - 3.5 K/UL    Monocytes Absolute 1.0 0.0 - 1.0 K/UL    Eosinophils Absolute 0.0 0.0 - 0.4 K/UL    Basophils Absolute 0.0 0.0 - 0.1 K/UL    Immature Granulocytes Absolute 0.1 (H) 0.00 - 0.04 K/UL    Differential Type AUTOMATED

## 2024-06-05 NOTE — LACTATION NOTE
24 0916   Visit Information   Lactation Consult Visit Type IP Consult Follow Up   Visit Length 30 minutes   Reason for Visit Normal [place] Visit   Breast Feeding History/Assessment   Left Breast Tender   Left Nipple Protrude   Right Nipple Protrude   Right Breast Tender   Feeding Assessment: Maternal Factors   Position and Latch Independently;Good technique   Signs of Transfer Nutritive sucking   Maternal Response Attentive   Feeding Assessment: Infant Factors   Infant Supplementation Expressed Breast Milk   Left Side Feeding   Infant Latch Observations Rooting;Wide open mouth;Good latch on   Infant Position Cradle   Infant Response to Feeding Feeding well   Right Side Feeding   Infant Latch Observations Good latch on   Infant Position Cradle   Infant Response to Feeding Feeding well   LATCH Documentation   Latch 2   Audible Swallowing 1   Type of Nipple 2   Comfort (Breast/Nipple) 1   Hold (Positioning) 2   LATCH Score 8   Care Plan/Breast Care   Breast Care Nipple shield   Lactation Comment Mother given instructions to adjust positioning to eliminated pain.  Reviewed feeding for second day of life and to expect more frequent feeding today.  Mother is for discharge.  Given number for [place] Pump and instructed to call if she does not recieve a pump.  Mother given instructions to use hand expressed drops to help heal nipples.  Noted infant has a very strong latch and suck.  Made adjustments and mother felt a difference in discomfort.  Gave extensive instructions on how to avoid nipple trauma.  Sending home with sheild to use if pain requires its use.